# Patient Record
Sex: MALE | Race: WHITE | NOT HISPANIC OR LATINO | Employment: OTHER | URBAN - METROPOLITAN AREA
[De-identification: names, ages, dates, MRNs, and addresses within clinical notes are randomized per-mention and may not be internally consistent; named-entity substitution may affect disease eponyms.]

---

## 2017-09-17 ENCOUNTER — HOSPITAL ENCOUNTER (EMERGENCY)
Facility: HOSPITAL | Age: 67
Discharge: HOME | End: 2017-09-17
Attending: EMERGENCY MEDICINE
Payer: MEDICARE

## 2017-09-17 VITALS
SYSTOLIC BLOOD PRESSURE: 154 MMHG | HEART RATE: 96 BPM | DIASTOLIC BLOOD PRESSURE: 73 MMHG | WEIGHT: 143 LBS | OXYGEN SATURATION: 98 % | BODY MASS INDEX: 22.44 KG/M2 | TEMPERATURE: 98 F | RESPIRATION RATE: 20 BRPM | HEIGHT: 67 IN

## 2017-09-17 DIAGNOSIS — R11.10 EMESIS: Primary | ICD-10-CM

## 2017-09-17 LAB
ALBUMIN SERPL BCP-MCNC: 4.9 G/DL
ALP SERPL-CCNC: 81 U/L
ALT SERPL W/O P-5'-P-CCNC: 21 U/L
ANION GAP SERPL CALC-SCNC: 14 MMOL/L
AST SERPL-CCNC: 28 U/L
BACTERIA #/AREA URNS AUTO: ABNORMAL /HPF
BASOPHILS # BLD AUTO: 0.01 K/UL
BASOPHILS NFR BLD: 0.1 %
BILIRUB SERPL-MCNC: 1.8 MG/DL
BILIRUB UR QL STRIP: NEGATIVE
BUN SERPL-MCNC: 18 MG/DL
CALCIUM SERPL-MCNC: 10 MG/DL
CHLORIDE SERPL-SCNC: 101 MMOL/L
CLARITY UR REFRACT.AUTO: CLEAR
CO2 SERPL-SCNC: 25 MMOL/L
COLOR UR AUTO: YELLOW
CREAT SERPL-MCNC: 0.92 MG/DL
DIFFERENTIAL METHOD: ABNORMAL
EOSINOPHIL # BLD AUTO: 0 K/UL
EOSINOPHIL NFR BLD: 0.4 %
ERYTHROCYTE [DISTWIDTH] IN BLOOD BY AUTOMATED COUNT: 13.7 %
EST. GFR  (AFRICAN AMERICAN): >60 ML/MIN/1.73 M^2
EST. GFR  (NON AFRICAN AMERICAN): >60 ML/MIN/1.73 M^2
GLUCOSE SERPL-MCNC: 146 MG/DL
GLUCOSE UR QL STRIP: NEGATIVE
HCT VFR BLD AUTO: 46.5 %
HGB BLD-MCNC: 15.7 G/DL
HGB UR QL STRIP: ABNORMAL
KETONES UR QL STRIP: ABNORMAL
LEUKOCYTE ESTERASE UR QL STRIP: ABNORMAL
LYMPHOCYTES # BLD AUTO: 1.4 K/UL
LYMPHOCYTES NFR BLD: 17.9 %
MCH RBC QN AUTO: 31 PG
MCHC RBC AUTO-ENTMCNC: 33.8 G/DL
MCV RBC AUTO: 92 FL
MICROSCOPIC COMMENT: ABNORMAL
MONOCYTES # BLD AUTO: 0.6 K/UL
MONOCYTES NFR BLD: 7.7 %
NEUTROPHILS # BLD AUTO: 5.6 K/UL
NEUTROPHILS NFR BLD: 73.6 %
NITRITE UR QL STRIP: NEGATIVE
PH UR STRIP: 6 [PH] (ref 5–8)
PLATELET # BLD AUTO: 316 K/UL
PMV BLD AUTO: 8.6 FL
POTASSIUM SERPL-SCNC: 4 MMOL/L
PROT SERPL-MCNC: 8.4 G/DL
PROT UR QL STRIP: ABNORMAL
RBC # BLD AUTO: 5.06 M/UL
RBC #/AREA URNS AUTO: 5 /HPF (ref 0–4)
SODIUM SERPL-SCNC: 140 MMOL/L
SP GR UR STRIP: 1.02 (ref 1–1.03)
SQUAMOUS #/AREA URNS AUTO: ABNORMAL /HPF
URN SPEC COLLECT METH UR: ABNORMAL
UROBILINOGEN UR STRIP-ACNC: 1 EU/DL
WBC # BLD AUTO: 7.56 K/UL
WBC #/AREA URNS AUTO: 4 /HPF (ref 0–5)

## 2017-09-17 PROCEDURE — 80053 COMPREHEN METABOLIC PANEL: CPT

## 2017-09-17 PROCEDURE — 85025 COMPLETE CBC W/AUTO DIFF WBC: CPT

## 2017-09-17 PROCEDURE — 99284 EMERGENCY DEPT VISIT MOD MDM: CPT

## 2017-09-17 PROCEDURE — 81000 URINALYSIS NONAUTO W/SCOPE: CPT

## 2017-09-17 RX ORDER — CILOSTAZOL 100 MG/1
50 TABLET ORAL 2 TIMES DAILY
COMMUNITY

## 2017-09-17 RX ORDER — LORATADINE 10 MG/1
10 TABLET ORAL DAILY
Qty: 60 TABLET | Refills: 0 | Status: SHIPPED | OUTPATIENT
Start: 2017-09-17 | End: 2018-09-17

## 2017-09-17 RX ORDER — CLOPIDOGREL BISULFATE 75 MG/1
75 TABLET ORAL DAILY
COMMUNITY

## 2017-09-17 RX ORDER — CILOSTAZOL 50 MG/1
50 TABLET ORAL 2 TIMES DAILY
COMMUNITY

## 2017-09-17 RX ORDER — METFORMIN HYDROCHLORIDE 1000 MG/1
1000 TABLET ORAL 2 TIMES DAILY WITH MEALS
COMMUNITY

## 2017-09-17 RX ORDER — NITROGLYCERIN 0.3 MG/1
0.3 TABLET SUBLINGUAL EVERY 5 MIN PRN
COMMUNITY

## 2017-09-17 RX ORDER — LORATADINE 10 MG/1
10 TABLET ORAL DAILY
Qty: 60 TABLET | Refills: 0 | COMMUNITY
Start: 2017-09-17 | End: 2017-09-17

## 2017-09-17 RX ORDER — PIOGLITAZONEHYDROCHLORIDE 45 MG/1
45 TABLET ORAL DAILY
COMMUNITY

## 2017-09-17 RX ORDER — ASPIRIN 81 MG/1
81 TABLET ORAL DAILY
COMMUNITY

## 2017-09-17 RX ORDER — LOSARTAN POTASSIUM AND HYDROCHLOROTHIAZIDE 12.5; 1 MG/1; MG/1
1 TABLET ORAL DAILY
COMMUNITY
End: 2021-10-04 | Stop reason: SDUPTHER

## 2017-09-17 RX ORDER — PROMETHAZINE HYDROCHLORIDE 25 MG/1
25 TABLET ORAL EVERY 6 HOURS PRN
Qty: 15 TABLET | Refills: 0 | Status: SHIPPED | OUTPATIENT
Start: 2017-09-17

## 2017-09-18 NOTE — ED PROVIDER NOTES
"Encounter Date: 9/17/2017       History     Chief Complaint   Patient presents with    Nausea     Pt c/o N/V since yesterday.  PT denies any abd pain or diarrhea.  PT c/o belching a lot.  PT's last BM was approx 2-3 days ago.  Pt states, "That's normal."  'I don't eat that much."      Vomiting     The history is provided by the patient.   Emesis    This is a new problem. The current episode started yesterday. The problem occurs 2 - 4 times per day. The problem has been unchanged. The emesis has an appearance of stomach contents. Pertinent negatives include no abdominal pain, no arthralgias, no chills, no cough, no diarrhea, no fever, no headaches, no myalgias and no sweats.     Review of patient's allergies indicates:  No Known Allergies  Past Medical History:   Diagnosis Date    Anticoagulant long-term use     Coronary artery disease     Diabetes mellitus     High cholesterol     Hypertension      Past Surgical History:   Procedure Laterality Date    CORONARY ANGIOPLASTY WITH STENT PLACEMENT      EXTERNAL EAR SURGERY       History reviewed. No pertinent family history.  Social History   Substance Use Topics    Smoking status: Former Smoker    Smokeless tobacco: Never Used    Alcohol use No     Review of Systems   Constitutional: Negative for chills and fever.   Respiratory: Negative for cough.    Gastrointestinal: Positive for vomiting. Negative for abdominal pain and diarrhea.   Musculoskeletal: Negative for arthralgias and myalgias.   Neurological: Negative for headaches.   All other systems reviewed and are negative.      Physical Exam     Initial Vitals [09/17/17 1932]   BP Pulse Resp Temp SpO2   (!) 121/58 101 20 98.1 °F (36.7 °C) 95 %      MAP       79         Physical Exam    Nursing note and vitals reviewed.  Constitutional: He appears well-developed and well-nourished.   HENT:   Head: Normocephalic and atraumatic.   Eyes: EOM are normal.   Neck: Normal range of motion. Neck supple. "   Cardiovascular: Normal rate, regular rhythm, normal heart sounds and intact distal pulses.   Pulmonary/Chest: Breath sounds normal.   Abdominal: Soft.   Musculoskeletal: Normal range of motion.   Neurological: He is alert and oriented to person, place, and time.   Skin: Skin is warm and dry. Capillary refill takes less than 2 seconds.   Psychiatric: He has a normal mood and affect. His behavior is normal. Judgment and thought content normal.         ED Course   Procedures  Labs Reviewed   COMPREHENSIVE METABOLIC PANEL - Abnormal; Notable for the following:        Result Value    Glucose 146 (*)     Total Bilirubin 1.8 (*)     All other components within normal limits   CBC W/ AUTO DIFFERENTIAL - Abnormal; Notable for the following:     MPV 8.6 (*)     Gran% 73.6 (*)     Lymph% 17.9 (*)     All other components within normal limits   URINALYSIS - Abnormal; Notable for the following:     Protein, UA Trace (*)     Ketones, UA 1+ (*)     Occult Blood UA Trace (*)     Leukocytes, UA 1+ (*)     All other components within normal limits   URINALYSIS MICROSCOPIC - Abnormal; Notable for the following:     RBC, UA 5 (*)     All other components within normal limits             Medical Decision Making:   Clinical Tests:   Lab Tests: Ordered and Reviewed  Radiological Study: Ordered and Reviewed                   ED Course      Clinical Impression:   The encounter diagnosis was Emesis.    Disposition:   Disposition: Discharged  Condition: Stable                        Christine Pantoja MD  09/17/17 5486

## 2017-12-17 ENCOUNTER — HOSPITAL ENCOUNTER (EMERGENCY)
Facility: HOSPITAL | Age: 67
Discharge: HOME | End: 2017-12-17
Attending: EMERGENCY MEDICINE
Payer: MEDICARE

## 2017-12-17 VITALS
TEMPERATURE: 98 F | WEIGHT: 137 LBS | DIASTOLIC BLOOD PRESSURE: 70 MMHG | BODY MASS INDEX: 21.5 KG/M2 | OXYGEN SATURATION: 99 % | HEART RATE: 90 BPM | SYSTOLIC BLOOD PRESSURE: 138 MMHG | RESPIRATION RATE: 18 BRPM | HEIGHT: 67 IN

## 2017-12-17 DIAGNOSIS — S90.30XA: Primary | ICD-10-CM

## 2017-12-17 PROCEDURE — 99283 EMERGENCY DEPT VISIT LOW MDM: CPT

## 2017-12-17 RX ORDER — GABAPENTIN 300 MG/1
300 CAPSULE ORAL 3 TIMES DAILY
COMMUNITY

## 2017-12-17 RX ORDER — OMEPRAZOLE 40 MG/1
40 CAPSULE, DELAYED RELEASE ORAL DAILY
COMMUNITY

## 2017-12-17 RX ORDER — LANOLIN ALCOHOL/MO/W.PET/CERES
100 CREAM (GRAM) TOPICAL DAILY
COMMUNITY

## 2017-12-18 NOTE — ED NOTES
Ambulatory to ER room 6 with steady gait; pt reports bruising to top of L foot, no open wounds noted, +2 pulses noted.  Pt denies injury.  Reports being on blood thinners and hx of neuropathy reported.  Awaiting MD assessment.  Will monitor closely.

## 2017-12-18 NOTE — ED PROVIDER NOTES
"Encounter Date: 12/17/2017       History     Chief Complaint   Patient presents with    Foot Injury     Left foot ecchymosis/swelling since this am, patient denies any hx of injury.  Patient states he has "blockages" in both legs, skin warm/dry, (+) distal pulses.     The history is provided by the patient.   Foot Injury    The incident occurred at home. There was no injury mechanism. The incident occurred several hours ago. Pain location: none. The pain is at a severity of 0/10. Pertinent negatives include no numbness, no inability to bear weight, no loss of motion, no muscle weakness, no loss of sensation and no tingling. He reports no foreign bodies present. Nothing aggravates the symptoms. He has tried nothing for the symptoms.     Review of patient's allergies indicates:  No Known Allergies  Past Medical History:   Diagnosis Date    Anticoagulant long-term use     Coronary artery disease     Diabetes mellitus     High cholesterol     Hypertension      Past Surgical History:   Procedure Laterality Date    CORONARY ANGIOPLASTY WITH STENT PLACEMENT      EXTERNAL EAR SURGERY      Stents in legs       History reviewed. No pertinent family history.  Social History   Substance Use Topics    Smoking status: Former Smoker    Smokeless tobacco: Never Used    Alcohol use No     Review of Systems   Skin: Positive for color change.   Neurological: Negative for tingling and numbness.       Physical Exam     Initial Vitals [12/17/17 2032]   BP Pulse Resp Temp SpO2   (!) 145/64 101 20 97.7 °F (36.5 °C) 100 %      MAP       91         Physical Exam    Nursing note and vitals reviewed.  Constitutional: He appears well-developed and well-nourished.   HENT:   Head: Normocephalic and atraumatic.   Eyes: EOM are normal.   Neck: Normal range of motion. Neck supple.   Cardiovascular: Normal rate, regular rhythm, normal heart sounds and intact distal pulses.   Pulmonary/Chest: Breath sounds normal.   Abdominal: Soft. "   Musculoskeletal: Normal range of motion.        Feet:    Neurological: He is alert and oriented to person, place, and time.   Skin: Skin is warm and dry. Capillary refill takes less than 2 seconds.   Psychiatric: He has a normal mood and affect. His behavior is normal. Judgment and thought content normal.         ED Course   Procedures  Labs Reviewed - No data to display                            ED Course      Clinical Impression:   The encounter diagnosis was Traumatic ecchymosis of foot, initial encounter.    Disposition:   Disposition: Discharged  Condition: Stable                        Christine Pantoja MD  12/17/17 2079

## 2021-10-04 ENCOUNTER — HOSPITAL ENCOUNTER (EMERGENCY)
Facility: HOSPITAL | Age: 71
Discharge: LEFT BEFORE DISCHARGE | End: 2021-10-04
Attending: EMERGENCY MEDICINE
Payer: MEDICARE

## 2021-10-04 VITALS
TEMPERATURE: 98 F | OXYGEN SATURATION: 98 % | SYSTOLIC BLOOD PRESSURE: 209 MMHG | HEART RATE: 86 BPM | DIASTOLIC BLOOD PRESSURE: 92 MMHG | BODY MASS INDEX: 19.73 KG/M2 | RESPIRATION RATE: 31 BRPM | WEIGHT: 126 LBS

## 2021-10-04 DIAGNOSIS — J40 BRONCHITIS: ICD-10-CM

## 2021-10-04 DIAGNOSIS — R04.2 HEMOPTYSIS: ICD-10-CM

## 2021-10-04 DIAGNOSIS — R05.9 COUGH: ICD-10-CM

## 2021-10-04 DIAGNOSIS — I10 HYPERTENSION, UNSPECIFIED TYPE: Primary | ICD-10-CM

## 2021-10-04 LAB
ALBUMIN SERPL BCP-MCNC: 4.7 G/DL (ref 3.5–5.2)
ALP SERPL-CCNC: 85 U/L (ref 38–126)
ALT SERPL W/O P-5'-P-CCNC: 10 U/L (ref 10–44)
ANION GAP SERPL CALC-SCNC: 14 MMOL/L (ref 8–16)
APTT BLDCRRT: 26.5 SEC (ref 21–32)
AST SERPL-CCNC: 24 U/L (ref 15–46)
BASOPHILS # BLD AUTO: 0.02 K/UL (ref 0–0.2)
BASOPHILS NFR BLD: 0.2 % (ref 0–1.9)
BILIRUB SERPL-MCNC: 1.1 MG/DL (ref 0.1–1)
CALCIUM SERPL-MCNC: 9.7 MG/DL (ref 8.7–10.5)
CHLORIDE SERPL-SCNC: 104 MMOL/L (ref 95–110)
CO2 SERPL-SCNC: 25 MMOL/L (ref 23–29)
CREAT SERPL-MCNC: 0.61 MG/DL (ref 0.5–1.4)
DIFFERENTIAL METHOD: ABNORMAL
EOSINOPHIL # BLD AUTO: 0 K/UL (ref 0–0.5)
EOSINOPHIL NFR BLD: 0.5 % (ref 0–8)
ERYTHROCYTE [DISTWIDTH] IN BLOOD BY AUTOMATED COUNT: 12.3 % (ref 11.5–14.5)
EST. GFR  (AFRICAN AMERICAN): >60 ML/MIN/1.73 M^2
EST. GFR  (NON AFRICAN AMERICAN): >60 ML/MIN/1.73 M^2
GLUCOSE SERPL-MCNC: 187 MG/DL (ref 70–110)
HCT VFR BLD AUTO: 45 % (ref 40–54)
HGB BLD-MCNC: 15.2 G/DL (ref 14–18)
IMM GRANULOCYTES # BLD AUTO: 0.05 K/UL (ref 0–0.04)
IMM GRANULOCYTES NFR BLD AUTO: 0.6 % (ref 0–0.5)
INR PPP: 1 (ref 0.8–1.2)
LYMPHOCYTES # BLD AUTO: 1.1 K/UL (ref 1–4.8)
LYMPHOCYTES NFR BLD: 12.8 % (ref 18–48)
MCH RBC QN AUTO: 31.3 PG (ref 27–31)
MCHC RBC AUTO-ENTMCNC: 33.8 G/DL (ref 32–36)
MCV RBC AUTO: 93 FL (ref 82–98)
MONOCYTES # BLD AUTO: 0.5 K/UL (ref 0.3–1)
MONOCYTES NFR BLD: 5.7 % (ref 4–15)
NEUTROPHILS # BLD AUTO: 6.9 K/UL (ref 1.8–7.7)
NEUTROPHILS NFR BLD: 80.2 % (ref 38–73)
NRBC BLD-RTO: 0 /100 WBC
NT-PROBNP SERPL-MCNC: 420 PG/ML (ref 5–900)
PLATELET # BLD AUTO: 342 K/UL (ref 150–450)
PMV BLD AUTO: 8.6 FL (ref 9.2–12.9)
POTASSIUM SERPL-SCNC: 3.6 MMOL/L (ref 3.5–5.1)
PROT SERPL-MCNC: 7.9 G/DL (ref 6–8.4)
PROTHROMBIN TIME: 10 SEC (ref 9–12.5)
RBC # BLD AUTO: 4.85 M/UL (ref 4.6–6.2)
SODIUM SERPL-SCNC: 143 MMOL/L (ref 136–145)
TROPONIN I SERPL-MCNC: 0.01 NG/ML (ref 0.01–0.03)
UUN UR-MCNC: 13 MG/DL (ref 2–20)
WBC # BLD AUTO: 8.59 K/UL (ref 3.9–12.7)

## 2021-10-04 PROCEDURE — 99285 EMERGENCY DEPT VISIT HI MDM: CPT | Mod: 25,ER

## 2021-10-04 PROCEDURE — 85730 THROMBOPLASTIN TIME PARTIAL: CPT | Mod: ER | Performed by: EMERGENCY MEDICINE

## 2021-10-04 PROCEDURE — 63600175 PHARM REV CODE 636 W HCPCS: Mod: ER | Performed by: EMERGENCY MEDICINE

## 2021-10-04 PROCEDURE — 80053 COMPREHEN METABOLIC PANEL: CPT | Mod: ER | Performed by: EMERGENCY MEDICINE

## 2021-10-04 PROCEDURE — 25000003 PHARM REV CODE 250: Mod: ER | Performed by: EMERGENCY MEDICINE

## 2021-10-04 PROCEDURE — 85025 COMPLETE CBC W/AUTO DIFF WBC: CPT | Mod: ER | Performed by: EMERGENCY MEDICINE

## 2021-10-04 PROCEDURE — 84484 ASSAY OF TROPONIN QUANT: CPT | Mod: ER | Performed by: EMERGENCY MEDICINE

## 2021-10-04 PROCEDURE — 83880 ASSAY OF NATRIURETIC PEPTIDE: CPT | Mod: ER | Performed by: EMERGENCY MEDICINE

## 2021-10-04 PROCEDURE — 96374 THER/PROPH/DIAG INJ IV PUSH: CPT | Mod: ER

## 2021-10-04 PROCEDURE — 93005 ELECTROCARDIOGRAM TRACING: CPT | Mod: ER

## 2021-10-04 PROCEDURE — 93010 ELECTROCARDIOGRAM REPORT: CPT | Mod: ,,, | Performed by: INTERNAL MEDICINE

## 2021-10-04 PROCEDURE — 85610 PROTHROMBIN TIME: CPT | Mod: ER | Performed by: EMERGENCY MEDICINE

## 2021-10-04 PROCEDURE — 96361 HYDRATE IV INFUSION ADD-ON: CPT | Mod: ER

## 2021-10-04 PROCEDURE — 93010 EKG 12-LEAD: ICD-10-PCS | Mod: ,,, | Performed by: INTERNAL MEDICINE

## 2021-10-04 PROCEDURE — 25500020 PHARM REV CODE 255: Mod: ER | Performed by: EMERGENCY MEDICINE

## 2021-10-04 PROCEDURE — C9113 INJ PANTOPRAZOLE SODIUM, VIA: HCPCS | Mod: ER | Performed by: EMERGENCY MEDICINE

## 2021-10-04 RX ORDER — HYDROCHLOROTHIAZIDE 12.5 MG/1
12.5 TABLET ORAL ONCE
Status: DISCONTINUED | OUTPATIENT
Start: 2021-10-04 | End: 2021-10-04

## 2021-10-04 RX ORDER — HYDROCHLOROTHIAZIDE 25 MG/1
25 TABLET ORAL
Status: COMPLETED | OUTPATIENT
Start: 2021-10-04 | End: 2021-10-04

## 2021-10-04 RX ORDER — BENZONATATE 100 MG/1
100 CAPSULE ORAL
Status: COMPLETED | OUTPATIENT
Start: 2021-10-04 | End: 2021-10-04

## 2021-10-04 RX ORDER — METOPROLOL SUCCINATE 100 MG/1
100 TABLET, EXTENDED RELEASE ORAL DAILY
Qty: 30 TABLET | Refills: 0 | Status: SHIPPED | OUTPATIENT
Start: 2021-10-04 | End: 2021-11-03

## 2021-10-04 RX ORDER — BENZONATATE 100 MG/1
100 CAPSULE ORAL 3 TIMES DAILY PRN
Qty: 9 CAPSULE | Refills: 0 | Status: SHIPPED | OUTPATIENT
Start: 2021-10-04 | End: 2021-10-07

## 2021-10-04 RX ORDER — LOSARTAN POTASSIUM 25 MG/1
100 TABLET ORAL
Status: COMPLETED | OUTPATIENT
Start: 2021-10-04 | End: 2021-10-04

## 2021-10-04 RX ORDER — LOSARTAN POTASSIUM AND HYDROCHLOROTHIAZIDE 12.5; 1 MG/1; MG/1
1 TABLET ORAL DAILY
Qty: 30 TABLET | Refills: 1 | Status: SHIPPED | OUTPATIENT
Start: 2021-10-04 | End: 2021-12-03

## 2021-10-04 RX ORDER — PANTOPRAZOLE SODIUM 40 MG/10ML
40 INJECTION, POWDER, LYOPHILIZED, FOR SOLUTION INTRAVENOUS
Status: COMPLETED | OUTPATIENT
Start: 2021-10-04 | End: 2021-10-04

## 2021-10-04 RX ORDER — CLONIDINE HYDROCHLORIDE 0.1 MG/1
0.2 TABLET ORAL
Status: COMPLETED | OUTPATIENT
Start: 2021-10-04 | End: 2021-10-04

## 2021-10-04 RX ORDER — METOPROLOL SUCCINATE 100 MG/1
100 TABLET, EXTENDED RELEASE ORAL DAILY
COMMUNITY
End: 2021-10-04 | Stop reason: SDUPTHER

## 2021-10-04 RX ORDER — CLONIDINE HYDROCHLORIDE 0.1 MG/1
0.2 TABLET ORAL
Status: DISCONTINUED | OUTPATIENT
Start: 2021-10-04 | End: 2021-10-04

## 2021-10-04 RX ORDER — AZITHROMYCIN 250 MG/1
250 TABLET, FILM COATED ORAL DAILY
Qty: 6 TABLET | Refills: 0 | Status: SHIPPED | OUTPATIENT
Start: 2021-10-04

## 2021-10-04 RX ADMIN — CLONIDINE HYDROCHLORIDE 0.2 MG: 0.1 TABLET ORAL at 09:10

## 2021-10-04 RX ADMIN — BENZONATATE 100 MG: 100 CAPSULE ORAL at 07:10

## 2021-10-04 RX ADMIN — PANTOPRAZOLE SODIUM 40 MG: 40 INJECTION, POWDER, FOR SOLUTION INTRAVENOUS at 07:10

## 2021-10-04 RX ADMIN — IOHEXOL 75 ML: 350 INJECTION, SOLUTION INTRAVENOUS at 09:10

## 2021-10-04 RX ADMIN — LOSARTAN POTASSIUM 100 MG: 25 TABLET, FILM COATED ORAL at 07:10

## 2021-10-04 RX ADMIN — HYDROCHLOROTHIAZIDE 12.5 MG: 25 TABLET ORAL at 08:10

## 2021-10-04 RX ADMIN — SODIUM CHLORIDE 500 ML: 0.9 INJECTION, SOLUTION INTRAVENOUS at 09:10

## 2024-04-25 ENCOUNTER — HOSPITAL ENCOUNTER (EMERGENCY)
Facility: HOSPITAL | Age: 74
Discharge: HOME | End: 2024-04-25
Attending: EMERGENCY MEDICINE
Payer: MEDICARE

## 2024-04-25 VITALS
DIASTOLIC BLOOD PRESSURE: 77 MMHG | BODY MASS INDEX: 20.09 KG/M2 | SYSTOLIC BLOOD PRESSURE: 175 MMHG | OXYGEN SATURATION: 99 % | HEART RATE: 82 BPM | WEIGHT: 125 LBS | HEIGHT: 66 IN | RESPIRATION RATE: 16 BRPM | TEMPERATURE: 98 F
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
## 2024-04-25 DIAGNOSIS — S61.210A LACERATION OF RIGHT INDEX FINGER WITHOUT FOREIGN BODY WITHOUT DAMAGE TO NAIL, INITIAL ENCOUNTER: ICD-10-CM

## 2024-04-25 PROCEDURE — 25000003 PHARM REV CODE 250: Performed by: PHYSICIAN ASSISTANT

## 2024-04-25 PROCEDURE — 99284 EMERGENCY DEPT VISIT MOD MDM: CPT | Mod: 25

## 2024-04-25 PROCEDURE — 12042 INTMD RPR N-HF/GENIT2.6-7.5: CPT

## 2024-04-25 PROCEDURE — 63600175 PHARM REV CODE 636 W HCPCS: Performed by: PHYSICIAN ASSISTANT

## 2024-04-25 RX ORDER — LIDOCAINE HYDROCHLORIDE 20 MG/ML
10 INJECTION, SOLUTION INFILTRATION; PERINEURAL
Status: DISCONTINUED | OUTPATIENT
Start: 2024-04-25 | End: 2024-04-25 | Stop reason: HOSPADM

## 2024-04-25 RX ORDER — AMOXICILLIN AND CLAVULANATE POTASSIUM 875; 125 MG/1; MG/1
1 TABLET, FILM COATED ORAL 2 TIMES DAILY
Qty: 20 TABLET | Refills: 0 | Status: SHIPPED | OUTPATIENT
Start: 2024-04-25 | End: 2024-05-05

## 2024-04-25 RX ORDER — ACETAMINOPHEN 500 MG
1000 TABLET ORAL
Status: COMPLETED | OUTPATIENT
Start: 2024-04-25 | End: 2024-04-25

## 2024-04-25 RX ADMIN — ACETAMINOPHEN 1000 MG: 500 TABLET ORAL at 04:04

## 2024-04-25 RX ADMIN — AMPICILLIN AND SULBACTAM 3 G: 2; 1 INJECTION, POWDER, FOR SOLUTION INTRAVENOUS at 04:04

## 2024-04-25 NOTE — ED TRIAGE NOTES
Patient comes into the emergency department by POV with complaints of animal bite. Patient states that taking out trash whe stray dog bit right index finger, bleeding controlled in triage, hx of blood thinners which he took this AM. Patient states he is in no pain right now.     LOC: The patient is awake, alert and aware of environment with an appropriate affect, the patient is oriented x 3 and speaking appropriately.   APPEARANCE: Patient appears comfortable and in no acute distress, patient is clean and well groomed.  SKIN: The skin is warm and dry, color consistent with ethnicity, patient has normal skin turgor and moist mucus membranes, skin intact, no breakdown or bruising noted. Right index finger bruised with laceration.  MUSCULOSKELETAL: Patient moving all extremities spontaneously, no swelling noted.  RESPIRATORY: Airway is open and patent, respirations are spontaneous, patient has a normal effort and rate, no accessory muscle.  CARDIAC:  no edema noted, capillary refill < 3 seconds.   GASTRO: Soft and non tender to palpation, no distention noted.  : Pt denies any pain or frequency with urination.  NEURO: Pt opens eyes spontaneously, behavior appropriate to situation, follows commands, facial expression symmetrical, bilateral hand grasp equal and even, purposeful motor response noted, normal sensation in all extremities when touched with a finger.

## 2024-04-25 NOTE — ED PROVIDER NOTES
Encounter Date: 4/25/2024       History     Chief Complaint   Patient presents with    Animal Bite     Patient reports he was bitten by a stray dog while taking the trash outside. Patient with bite to right index finger--bleeding controlled in triage. Last tetanus shot x 2 years ago per patient report.     The history is provided by the patient and medical records. No  was used.     Kishan Dow is a 73 y.o. male with medical history of HTN, DM, CAD on Plavix presenting to the ED with the chief complaint of animal bite.     Patient was breaking up a fight between his daughter's dog and 2 of the neighbors dog about 2.5 hours PTA. Sustained a bite injury to his R index finger. He is unclear which dog bit his finger. Reports all dogs are up to date on their vaccinations. Reports having several lacerations to his R index finger. He is compliant with his Plavix therapy. Denies other bodily injury. No numbness or ROM difficulties.    Review of patient's allergies indicates:  No Known Allergies  Past Medical History:   Diagnosis Date    Anticoagulant long-term use     Coronary artery disease     Diabetes mellitus     High cholesterol     Hypertension      Past Surgical History:   Procedure Laterality Date    CORONARY ANGIOPLASTY WITH STENT PLACEMENT      EXTERNAL EAR SURGERY      Stents in legs       No family history on file.  Social History     Tobacco Use    Smoking status: Former    Smokeless tobacco: Never   Substance Use Topics    Alcohol use: No    Drug use: No     Review of Systems   Skin:  Positive for wound.       Physical Exam     Initial Vitals [04/25/24 1413]   BP Pulse Resp Temp SpO2   139/70 110 16 97.5 °F (36.4 °C) 95 %      MAP       --         Physical Exam    Constitutional: He appears well-developed and well-nourished. He is not diaphoretic. No distress.   HENT:   Head: Normocephalic and atraumatic.   Mouth/Throat: Oropharynx is clear and moist.   Eyes: EOM are normal. Pupils are  equal, round, and reactive to light.   Neck:   Normal range of motion.  Cardiovascular:  Normal rate and regular rhythm.           Pulmonary/Chest: No respiratory distress.   Musculoskeletal:         General: Normal range of motion.      Cervical back: Normal range of motion.      Comments: R index finger  Full MCP, PIP, DIP ROM. No nailbed injury  Two lacerations involving middle and proximal phalanx palmar aspect  Superficial laceration to dorsal aspect of middle phalanx     Neurological: He is alert and oriented to person, place, and time.   Skin: Skin is warm and dry. No rash noted.     R index finger:            ED Course   Lac Repair    Date/Time: 4/25/2024 7:45 PM    Performed by: Philip Hampton PA-C  Authorized by: Inez Lomax Jr., MD    Consent:     Consent obtained:  Verbal and emergent situation    Risks discussed:  Infection and need for additional repair  Universal protocol:     Patient identity confirmed:  Verbally with patient  Laceration details:     Location:  Finger    Finger location:  R index finger    Length (cm):  3  Exploration:     Imaging obtained: x-ray      Imaging outcome: foreign body not noted      Wound exploration: wound explored through full range of motion and entire depth of wound visualized    Treatment:     Area cleansed with:  Saline and povidone-iodine    Amount of cleaning:  Extensive    Irrigation solution:  Sterile saline    Irrigation method:  Pressure wash    Visualized foreign bodies/material removed: no      Debridement:  Minimal    Undermining:  None    Scar revision: no    Skin repair:     Repair method:  Sutures and Steri-Strips    Suture size:  4-0    Suture material:  Nylon    Suture technique:  Simple interrupted    Number of sutures:  2    Number of Steri-Strips:  2  Approximation:     Approximation:  Loose  Repair type:     Repair type:  Intermediate  Post-procedure details:     Dressing:  Non-adherent dressing    Procedure completion:  Tolerated well,  no immediate complications    Labs Reviewed - No data to display       Imaging Results              X-Ray Finger 2 or More Views Right (Final result)  Result time 04/25/24 17:28:54      Final result by Goyo Fuentes MD (04/25/24 17:28:54)                   Impression:      1. No convincing acute displaced fracture or dislocation of the 2nd digit noting edema and soft tissue injury.      Electronically signed by: Goyo Fuentes MD  Date:    04/25/2024  Time:    17:28               Narrative:    EXAMINATION:  XR FINGER 2 OR MORE VIEWS RIGHT    CLINICAL HISTORY:  R index finger injury. Dog bite;    COMPARISON:  None    FINDINGS:  Four views right hand.    There is osteopenia.  There are degenerative changes of the PIP and D IP joints of the visualized digits.  There is edema about the mid aspect of the 2nd digit.  No convincing acute displaced fracture or dislocation of the visualized digits.  No radiopaque foreign body.                                       Medications   LIDOcaine HCL 20 mg/ml (2%) injection 10 mL (0 mLs Infiltration Hold 4/25/24 1600)   ampicillin-sulbactam (UNASYN) 3 g in sodium chloride 0.9 % 100 mL IVPB (MB+) (0 g Intravenous Stopped 4/25/24 1714)   acetaminophen tablet 1,000 mg (1,000 mg Oral Given 4/25/24 1656)     Medical Decision Making  73 y.o. male with medical history of HTN, DM, CAD on Plavix presenting to the ED c/o R index finger bite wound occurring 2.5 hours PTA.     DDx includes but not limited to laceration, foreign body, arterial injury, nerve injury, fracture or tendon injury. Reports dogs were up to date on vaccinations and do not feel Rabies prophylaxis needed at this time. Offered to update patient's tetanus but he declined and acknowledged risks of doing so.       Amount and/or Complexity of Data Reviewed  Radiology: ordered.    Risk  OTC drugs.  Prescription drug management.               ED Course as of 04/25/24 1944   Thu Apr 25, 2024   1902 -XR with no evidence of FB  or fracture.   -Extensively irrigated wound and cleansed at bedside.  -Wound loosely closed with sutures and steri-strips as described in procedure note above.  -Finger splint and ACE wrap applied.   -I discussed my concerns for high risk of infection with the patient and his daughter at bedside. I discussed importance of watching the wound and to return to the ED for signs of infection.  -Wound care instructions provided.   -Dose of Unasyn given in the ED. RX for Augmentin provided.  -Advised outpatient follow-up with Ortho hand for further evaluation. Ambulatory referral placed.  Patient expresses understanding and agreeable to the plan. Return to ED precautions given for new, worsening, or concerning symptoms. I have discussed the care of this patient with my supervising physician.  [BA]      ED Course User Index  [BA] Philip Hampton PA-C                           Clinical Impression:  Final diagnoses:  [W54.0XXA] Dog bite, initial encounter (Primary)  [S61.210A] Laceration of right index finger without foreign body without damage to nail, initial encounter          ED Disposition Condition    Discharge Stable          ED Prescriptions       Medication Sig Dispense Start Date End Date Auth. Provider    amoxicillin-clavulanate 875-125mg (AUGMENTIN) 875-125 mg per tablet Take 1 tablet by mouth 2 (two) times daily. for 10 days 20 tablet 4/25/2024 5/5/2024 Philip Hampton PA-C          Follow-up Information       Follow up With Specialties Details Why Contact Info Additional Information    [facility]   [address], [address]  [facility] [address] [phone] [facility] - Main Building, 5th Floor Please park in [facility] and take [address] elevator             Philip Hampton PA-C  04/25/24 9989

## 2024-04-26 ENCOUNTER — TELEPHONE (OUTPATIENT)
Dept: ORTHOPEDICS | Facility: CLINIC | Age: 74
End: 2024-04-26
Payer: MEDICARE

## 2024-04-26 NOTE — DISCHARGE INSTRUCTIONS
Diagnosis: Laceration    --Take the prescribed Augmentin to prevent an infection.   --Follow-up with [facility] for further management of your wound.   --Keep the original dressing in place for the next 24 hours and then change twice daily thereafter. Apply over-the-counter antibiotic ointment (Neosporin, Neomycin, Bacitracin, etc) when changing the dressing.  --You may cleanse daily by rinsing gently with soap and water. Change your dressing if it becomes soiled or wet.  --It is okay to shower. Do not soak the wound in water. This includes swimming pools, hot tubs, or while washing dishes.   --If your stitches or staples require removal, you may seek care at your primary care, urgent care, or the emergency room. [facility] will not charge you for an additional visit for stiches or staple removal regardless of your insurance status.   --Apply sunscreen to the area after sutures removed for the next year as sunscreen may aid in reducing the appearance of scaring  --Return to the emergency department if you develop fevers, spreading redness or streaking redness, severe pain, swelling, or leakage of pus from the wound.

## 2024-04-26 NOTE — TELEPHONE ENCOUNTER
Spoke c pt. Confirmed appt location & time [facility] 05/01/24 for wound check. Pts daughter expressed understanding & was thankful.

## 2024-04-29 NOTE — H&P (VIEW-ONLY)
[facility]  History & Physical  Orthopedics    SUBJECTIVE:     Chief Complaint: ED follow up right index finger laceration/ animal bite     Referring Provider: Self, Aaareferral     History of Present Illness:  Patient is a 73 y.o. right hand dominant male who presents ED FU for right index finger laceration/animal bite. Patient reports he was breaking a fight between his daughter's dog and neighbors dog and sustained a laceration to his right index finger. He reports going to the ER 4/25/2024. Partial wound closure with nylon stiches/steris trip and irrigation was performed by ER attending. Unasyn given in ER. Patient admits to taking Augmentin which was prescribed 4/25/2024. Patient was placed in finger splint.   He denies any pain. Reports stiffness when flexing right index finger. He denies any numbness or tingling.   Patient reports he is up to date with tetanus shot which he received two years ago.  He is on Plavix. He is a DM and smoker. Hx of MI in 2004.  Onset of symptoms/DOI was 4/25/2024.  The patient denies any fevers, chills, N/V, D/C and presents for evaluation.          Review of patient's allergies indicates:  No Known Allergies    Past Medical History:   Diagnosis Date    Anticoagulant long-term use     Coronary artery disease     Diabetes mellitus     High cholesterol     Hypertension      Past Surgical History:   Procedure Laterality Date    CORONARY ANGIOPLASTY WITH STENT PLACEMENT      EXTERNAL EAR SURGERY      Stents in legs       No family history on file.  Social History     Tobacco Use    Smoking status: Former    Smokeless tobacco: Never   Substance Use Topics    Alcohol use: No    Drug use: No        Review of Systems:  Constitutional: Denies fever/chills  Neurological: Denies numbness/tingling (any exceptions noted in orthopaedic exam)   Psychiatric/Behavioral: Denies change in normal mood  Eyes: Denies change in vision  Cardiovascular: Denies chest pain  Respiratory:  Denies shortness of breath  Hematologic/Lymphatic: Denies easy bleeding/bruising   Skin: Denies new rash or skin lesions   Gastrointestinal: Denies nausea/vomitting/diarrhea, change in bowel habits, abdominal pain   Allergic/Immunologic: Denies adverse reactions to current medications  Musculoskeletal: see HPI      OBJECTIVE:     Vital Signs (Most Recent)       Physical Exam:  Gen:  No acute distress  CV:  Peripherally well-perfused.  Pulses 2+ bilaterally.  Lungs:  Normal respiratory effort.  Abdomen:  Soft, non-tender, non-distended  Head/Neck:  Normocephalic.  Atraumatic. No TTP, AROM and PROM intact without pain  Neuro:  CN intact without deficit, SILT throughout B/L Upper & Lower Extremities    Right Hand/Wrist Examination:    Observation/Inspection:  Swelling  Mild right index finger    Deformity  none  Discoloration  none     Scars   none    Atrophy  none  Stiffness right index finger MCP, PIP, DIP ROM with with noted flexion and extension . No nailbed injury. No tenderness at  proximal palmar or dorsal aspect of right hand. No streaking. Patient able to flex and extend right wrist.   Two lacerations involving middle and proximal phalanx palmar aspect. With partial wound closure with nylon and steri strips intact.  Superficial laceration to dorsal aspect of middle phalanx .  Not able to make a composite fist.  Nylon Suture intact with lacerations partially open with steri strips. No drainage/erythema noted or     Neurovascular Exam:  Digits WWP, brisk CR < 3s throughout  NVI motor/LTS to M/R/U nerves, radial pulse 2+    ROM wrist full, painless    ROM elbow full, painless    Abdomen not guarded  Respirations nonlabored  Perfusion intact    Diagnostic Results:   Xray right index finger 04/25/2024  Impression:     1. No convincing acute displaced fracture or dislocation of the 2nd digit noting edema and soft tissue injury.  Imaging - I independently viewed the patient's imaging as well as the radiology report.    EMG - none     ASSESSMENT/PLAN:     There are no diagnoses linked to this encounter.     73 y.o. yo male with right index finger laceration   Kishan was seen today for finger injury, pain and itching.    Diagnoses and all orders for this visit:    Laceration of right index finger without foreign body without damage to nail, initial encounter         Plan:  XRAY right hand reviewed and discussed with patient. No acute fractures or dislocation noted. Patient was able to flex/extend DIP, PIP, MCP right index fingers including other digits. Patient was able to flex and extend wrist.  Based on HPI/ exam today discussed /recommended surgical intervention for wound closure and wound exploration. Risk and benefits were discussed.  Also recommended patient should refrain from smoking due to risk of delayed wound healing. Patient has a history of DM.   Patient agreed to this plan and surgical consent were signed today. Surgery to be scheduled Friday.   Patient redressed with radial gutter splint.

## 2024-05-01 ENCOUNTER — OFFICE VISIT (OUTPATIENT)
Dept: ORTHOPEDICS | Facility: CLINIC | Age: 74
End: 2024-05-01
Payer: MEDICARE

## 2024-05-01 ENCOUNTER — TELEPHONE (OUTPATIENT)
Dept: ORTHOPEDICS | Facility: CLINIC | Age: 74
End: 2024-05-01
Payer: MEDICARE

## 2024-05-01 VITALS
BODY MASS INDEX: 20.09 KG/M2 | WEIGHT: 125 LBS | SYSTOLIC BLOOD PRESSURE: 174 MMHG | HEIGHT: 66 IN | DIASTOLIC BLOOD PRESSURE: 72 MMHG

## 2024-05-01 DIAGNOSIS — S61.210A LACERATION OF RIGHT INDEX FINGER WITHOUT FOREIGN BODY WITHOUT DAMAGE TO NAIL, INITIAL ENCOUNTER: Primary | ICD-10-CM

## 2024-05-01 DIAGNOSIS — M79.641 RIGHT HAND PAIN: Primary | ICD-10-CM

## 2024-05-01 PROCEDURE — 99999 PR PBB SHADOW E&M-EST. PATIENT-LVL IV: CPT | Mod: PBBFAC,,,

## 2024-05-01 PROCEDURE — 99204 OFFICE O/P NEW MOD 45 MIN: CPT | Mod: S$PBB,,,

## 2024-05-01 PROCEDURE — 29125 APPL SHORT ARM SPLINT STATIC: CPT | Mod: PBBFAC,RT

## 2024-05-01 PROCEDURE — 99999PBSHW PR PBB SHADOW TECHNICAL ONLY FILED TO HB: Mod: PBBFAC,,,

## 2024-05-01 PROCEDURE — 99214 OFFICE O/P EST MOD 30 MIN: CPT | Mod: PBBFAC

## 2024-05-02 ENCOUNTER — TELEPHONE (OUTPATIENT)
Dept: ORTHOPEDICS | Facility: CLINIC | Age: 74
End: 2024-05-02
Payer: MEDICARE

## 2024-05-02 ENCOUNTER — ANESTHESIA EVENT (OUTPATIENT)
Dept: SURGERY | Facility: HOSPITAL | Age: 74
End: 2024-05-02
Payer: MEDICARE

## 2024-05-02 RX ORDER — TRAMADOL HYDROCHLORIDE 50 MG/1
50 TABLET ORAL EVERY 6 HOURS PRN
Qty: 10 TABLET | Refills: 0 | Status: SHIPPED | OUTPATIENT
Start: 2024-05-02

## 2024-05-02 NOTE — TELEPHONE ENCOUNTER
Spoke c pts daughter. Informed pt of 7:00 a.m. arrival time for 05/03/24 surgery at the [facility]. Reminded pt of NPO status & PO appt. Pt expressed understanding & was thankful.     Confirmed that pt is NOT taking GLP-1 injectable - mismarked on his Patient Teaching Educ form.

## 2024-05-02 NOTE — ANESTHESIA PREPROCEDURE EVALUATION
05/02/2024  Kishan Dow is a 73 y.o., male.  Pre-operative evaluation for Procedure(s) (LRB):  EXPLORATION, WOUND, UPPER EXTREMITY (Right)    Kishan Dow is a 73 y.o. male   Very [place]  PONV    There is no problem list on file for this patient.      Past Surgical History:   Procedure Laterality Date    CORONARY ANGIOPLASTY WITH STENT PLACEMENT      EXTERNAL EAR SURGERY      Stents in legs         Social History     Socioeconomic History    Marital status:    Tobacco Use    Smoking status: Former    Smokeless tobacco: Never   Substance and Sexual Activity    Alcohol use: No    Drug use: No       No current facility-administered medications on file prior to encounter.     Current Outpatient Medications on File Prior to Encounter   Medication Sig Dispense Refill    amoxicillin-clavulanate 875-125mg (AUGMENTIN) 875-125 mg per tablet Take 1 tablet by mouth 2 (two) times daily. for 10 days 20 tablet 0    aspirin (ECOTRIN) 81 MG EC tablet Take 81 mg by mouth once daily.      azithromycin (Z-ANDREW) 250 MG tablet Take 1 tablet (250 mg total) by mouth once daily. Take first 2 tablets together, then 1 every day until finished. 6 tablet 0    cilostazol (PLETAL) 100 MG Tab Take 50 mg by mouth 2 (two) times daily.      cilostazol (PLETAL) 50 MG Tab Take 50 mg by mouth 2 (two) times daily.      clopidogrel (PLAVIX) 75 mg tablet Take 75 mg by mouth once daily.      gabapentin (NEURONTIN) 300 MG capsule Take 300 mg by mouth 3 (three) times daily.      linagliptin (TRADJENTA) 5 mg Tab tablet Take 5 mg by mouth once daily.      loratadine (CLARITIN) 10 mg tablet Take 1 tablet (10 mg total) by mouth once daily. 60 tablet 0    losartan-hydrochlorothiazide 100-12.5 mg (HYZAAR) 100-12.5 mg Tab Take 1 tablet by mouth once daily. 30 tablet 1    metformin (GLUCOPHAGE) 1000 MG tablet Take 1,000 mg by mouth 2 (two) times  "daily with meals.      metoprolol succinate (TOPROL-XL) 100 MG 24 hr tablet Take 1 tablet (100 mg total) by mouth once daily. 30 tablet 0    nitroGLYCERIN (NITROSTAT) 0.3 MG SL tablet Place 0.3 mg under the tongue every 5 (five) minutes as needed for Chest pain.      omeprazole (PRILOSEC) 40 MG capsule Take 40 mg by mouth once daily.      pioglitazone (ACTOS) 45 MG tablet Take 45 mg by mouth once daily.      promethazine (PHENERGAN) 25 MG tablet Take 1 tablet (25 mg total) by mouth every 6 (six) hours as needed for Nausea. 15 tablet 0    thiamine (VITAMIN B-1) 100 MG tablet Take 100 mg by mouth once daily.         Review of patient's allergies indicates:  No Known Allergies      CBC: No results for input(s): "WBC", "RBC", "HGB", "HCT", "PLT", "MCV", "MCH", "MCHC" in the last 72 hours.    CMP: No results for input(s): "NA", "K", "CL", "CO2", "BUN", "CREATININE", "GLU", "MG", "PHOS", "CALCIUM", "ALBUMIN", "PROT", "ALKPHOS", "ALT", "AST", "BILITOT" in the last 72 hours.    INR  No results for input(s): "PT", "INR", "PROTIME", "APTT" in the last 72 hours.      Diagnostic Studies:    EKG:   Results for orders placed or performed during the hospital encounter of 10/04/21   EKG 12-lead    Collection Time: 10/04/21  7:36 AM    Narrative    Test Reason : R05.9,    Vent. Rate : 091 BPM     Atrial Rate : 091 BPM     P-R Int : 144 ms          QRS Dur : 126 ms      QT Int : 414 ms       P-R-T Axes : 080 103 047 degrees     QTc Int : 509 ms    Normal sinus rhythm  Right bundle branch block  Abnormal ECG  No previous ECGs available  Confirmed by Martín Garzon MD ([id number]) on 10/4/2021 11:15:39 AM    Referred By: [email or web address]   SELF           Confirmed By:Martín Garzon MD       TTE:  No results found for this or any previous visit.  No results found for: "EF"   No results found for this or any previous visit.    CHILANGO:  No results found for this or any previous visit.    Stress Test:  No results found for this or any previous " "visit.       LHC:  No results found for this or any previous visit.       PFT:  No results found for: "FEV1", "FVC", "OEH8AZQ", "TLC", "DLCO"     ALLERGIES:   Review of patient's allergies indicates:  No Known Allergies  LDA:          Lines/Drains/Airways       None                  Anesthesia Evaluation      Airway   Mallampati: II  TM distance: > 6 cm  Neck ROM: Normal ROM  Dental      Pulmonary    Cardiovascular   (+) hypertension    Rate: Normal    Neuro/Psych      GI/Hepatic/Renal      Endo/Other    (+) diabetes mellitus  Abdominal                           Pre-op Assessment    I have reviewed the Patient Summary Reports.     I have reviewed the Nursing Notes. I have reviewed the NPO Status.   I have reviewed the Medications.     Review of Systems  Anesthesia Hx:    PONV           Denies Family Hx of Anesthesia complications.   Personal Hx of Anesthesia complications, Post-Operative Nausea/Vomiting                    Social:  Smoker       Cardiovascular:     Hypertension          PVD                              Pulmonary:  Pulmonary Normal                       Renal/:  Renal/ Normal                 Hepatic/GI:  Hepatic/GI Normal                 Neurological:  Neurology Normal                                      Endocrine:  Diabetes               Physical Exam  General: Well nourished    Airway:  Mallampati: II   Mouth Opening: Normal  TM Distance: > 6 cm  Tongue: Normal  Neck ROM: Normal ROM    Dental:  Mostly missing teeth with a few brown, broken ones remaining; pt understands risk of additional dental trauma and agrees to proceed  Chest/Lungs:  Normal Respiratory Rate    Heart:  Rate: Normal        Anesthesia Plan  Type of Anesthesia, risks & benefits discussed:    Anesthesia Type: MAC  Intra-op Monitoring Plan: Standard ASA Monitors  Post Op Pain Control Plan: multimodal analgesia and IV/PO Opioids PRN  Induction:  IV  Airway Plan: Direct, Post-Induction  Informed Consent: Informed consent signed " with the Patient and all parties understand the risks and agree with anesthesia plan.  All questions answered. Patient consented to blood products? Yes  ASA Score: 3  Day of Surgery Review of History & Physical: H&P Update referred to the surgeon/provider.    Ready For Surgery From Anesthesia Perspective.     .

## 2024-05-03 ENCOUNTER — HOSPITAL ENCOUNTER (OUTPATIENT)
Facility: HOSPITAL | Age: 74
Discharge: HOME | End: 2024-05-03
Attending: ORTHOPAEDIC SURGERY | Admitting: ORTHOPAEDIC SURGERY
Payer: MEDICARE

## 2024-05-03 ENCOUNTER — ANESTHESIA (OUTPATIENT)
Dept: SURGERY | Facility: HOSPITAL | Age: 74
End: 2024-05-03
Payer: MEDICARE

## 2024-05-03 VITALS
DIASTOLIC BLOOD PRESSURE: 67 MMHG | SYSTOLIC BLOOD PRESSURE: 191 MMHG | OXYGEN SATURATION: 97 % | TEMPERATURE: 98 F | WEIGHT: 112 LBS | HEIGHT: 66 IN | RESPIRATION RATE: 15 BRPM | BODY MASS INDEX: 18 KG/M2 | HEART RATE: 74 BPM

## 2024-05-03 DIAGNOSIS — S61.210A LACERATION OF RIGHT INDEX FINGER WITHOUT FOREIGN BODY WITHOUT DAMAGE TO NAIL, INITIAL ENCOUNTER: ICD-10-CM

## 2024-05-03 LAB
POCT GLUCOSE: 118 MG/DL (ref 70–110)
POCT GLUCOSE: 155 MG/DL (ref 70–110)

## 2024-05-03 PROCEDURE — 63600175 PHARM REV CODE 636 W HCPCS: Mod: JZ,JG | Performed by: ORTHOPAEDIC SURGERY

## 2024-05-03 PROCEDURE — 82962 GLUCOSE BLOOD TEST: CPT | Performed by: ORTHOPAEDIC SURGERY

## 2024-05-03 PROCEDURE — 11042 DBRDMT SUBQ TIS 1ST 20SQCM/<: CPT | Mod: ,,, | Performed by: ORTHOPAEDIC SURGERY

## 2024-05-03 PROCEDURE — 25000003 PHARM REV CODE 250: Performed by: ORTHOPAEDIC SURGERY

## 2024-05-03 PROCEDURE — D9220A PRA ANESTHESIA: Mod: ANES,,, | Performed by: ANESTHESIOLOGY

## 2024-05-03 PROCEDURE — 94761 N-INVAS EAR/PLS OXIMETRY MLT: CPT

## 2024-05-03 PROCEDURE — 71000015 HC POSTOP RECOV 1ST HR: Performed by: ORTHOPAEDIC SURGERY

## 2024-05-03 PROCEDURE — 36000706: Performed by: ORTHOPAEDIC SURGERY

## 2024-05-03 PROCEDURE — D9220A PRA ANESTHESIA: Mod: CRNA,,, | Performed by: NURSE ANESTHETIST, CERTIFIED REGISTERED

## 2024-05-03 PROCEDURE — 25000003 PHARM REV CODE 250: Performed by: PHYSICIAN ASSISTANT

## 2024-05-03 PROCEDURE — 25000003 PHARM REV CODE 250: Performed by: NURSE ANESTHETIST, CERTIFIED REGISTERED

## 2024-05-03 PROCEDURE — 71000033 HC RECOVERY, INTIAL HOUR: Performed by: ORTHOPAEDIC SURGERY

## 2024-05-03 PROCEDURE — 27201423 OPTIME MED/SURG SUP & DEVICES STERILE SUPPLY: Performed by: ORTHOPAEDIC SURGERY

## 2024-05-03 PROCEDURE — 99900035 HC TECH TIME PER 15 MIN (STAT)

## 2024-05-03 PROCEDURE — 63600175 PHARM REV CODE 636 W HCPCS: Performed by: NURSE ANESTHETIST, CERTIFIED REGISTERED

## 2024-05-03 PROCEDURE — 36000707: Performed by: ORTHOPAEDIC SURGERY

## 2024-05-03 PROCEDURE — 37000009 HC ANESTHESIA EA ADD 15 MINS: Performed by: ORTHOPAEDIC SURGERY

## 2024-05-03 PROCEDURE — 37000008 HC ANESTHESIA 1ST 15 MINUTES: Performed by: ORTHOPAEDIC SURGERY

## 2024-05-03 RX ORDER — ACETAMINOPHEN 500 MG
1000 TABLET ORAL
Status: COMPLETED | OUTPATIENT
Start: 2024-05-03 | End: 2024-05-03

## 2024-05-03 RX ORDER — HYDROCODONE BITARTRATE AND ACETAMINOPHEN 10; 325 MG/1; MG/1
1 TABLET ORAL EVERY 4 HOURS PRN
Status: DISCONTINUED | OUTPATIENT
Start: 2024-05-03 | End: 2024-05-03 | Stop reason: HOSPADM

## 2024-05-03 RX ORDER — CEFAZOLIN SODIUM 1 G/3ML
INJECTION, POWDER, FOR SOLUTION INTRAMUSCULAR; INTRAVENOUS
Status: DISCONTINUED | OUTPATIENT
Start: 2024-05-03 | End: 2024-05-03

## 2024-05-03 RX ORDER — ONDANSETRON HYDROCHLORIDE 2 MG/ML
4 INJECTION, SOLUTION INTRAVENOUS EVERY 12 HOURS PRN
Status: DISCONTINUED | OUTPATIENT
Start: 2024-05-03 | End: 2024-05-03 | Stop reason: HOSPADM

## 2024-05-03 RX ORDER — PROPOFOL 10 MG/ML
VIAL (ML) INTRAVENOUS CONTINUOUS PRN
Status: DISCONTINUED | OUTPATIENT
Start: 2024-05-03 | End: 2024-05-03

## 2024-05-03 RX ORDER — FENTANYL CITRATE 50 UG/ML
25 INJECTION, SOLUTION INTRAMUSCULAR; INTRAVENOUS EVERY 5 MIN PRN
Status: DISCONTINUED | OUTPATIENT
Start: 2024-05-03 | End: 2024-05-03 | Stop reason: HOSPADM

## 2024-05-03 RX ORDER — ONDANSETRON HYDROCHLORIDE 2 MG/ML
INJECTION, SOLUTION INTRAVENOUS
Status: DISCONTINUED | OUTPATIENT
Start: 2024-05-03 | End: 2024-05-03

## 2024-05-03 RX ORDER — MIDAZOLAM HYDROCHLORIDE 1 MG/ML
INJECTION, SOLUTION INTRAMUSCULAR; INTRAVENOUS
Status: DISCONTINUED | OUTPATIENT
Start: 2024-05-03 | End: 2024-05-03

## 2024-05-03 RX ORDER — BUPIVACAINE HYDROCHLORIDE 2.5 MG/ML
INJECTION, SOLUTION EPIDURAL; INFILTRATION; INTRACAUDAL
Status: DISCONTINUED | OUTPATIENT
Start: 2024-05-03 | End: 2024-05-03 | Stop reason: HOSPADM

## 2024-05-03 RX ORDER — ONDANSETRON HYDROCHLORIDE 2 MG/ML
4 INJECTION, SOLUTION INTRAVENOUS DAILY PRN
Status: DISCONTINUED | OUTPATIENT
Start: 2024-05-03 | End: 2024-05-03 | Stop reason: HOSPADM

## 2024-05-03 RX ORDER — LIDOCAINE HYDROCHLORIDE 20 MG/ML
INJECTION INTRAVENOUS
Status: DISCONTINUED | OUTPATIENT
Start: 2024-05-03 | End: 2024-05-03

## 2024-05-03 RX ORDER — MUPIROCIN 20 MG/G
OINTMENT TOPICAL 2 TIMES DAILY
Status: DISCONTINUED | OUTPATIENT
Start: 2024-05-03 | End: 2024-05-03 | Stop reason: HOSPADM

## 2024-05-03 RX ORDER — FENTANYL CITRATE 50 UG/ML
INJECTION, SOLUTION INTRAMUSCULAR; INTRAVENOUS
Status: DISCONTINUED | OUTPATIENT
Start: 2024-05-03 | End: 2024-05-03

## 2024-05-03 RX ORDER — METOCLOPRAMIDE HYDROCHLORIDE 5 MG/ML
5 INJECTION INTRAMUSCULAR; INTRAVENOUS EVERY 6 HOURS PRN
Status: DISCONTINUED | OUTPATIENT
Start: 2024-05-03 | End: 2024-05-03 | Stop reason: HOSPADM

## 2024-05-03 RX ORDER — LIDOCAINE HYDROCHLORIDE 10 MG/ML
INJECTION, SOLUTION EPIDURAL; INFILTRATION; INTRACAUDAL; PERINEURAL
Status: DISCONTINUED | OUTPATIENT
Start: 2024-05-03 | End: 2024-05-03 | Stop reason: HOSPADM

## 2024-05-03 RX ORDER — PROPOFOL 10 MG/ML
VIAL (ML) INTRAVENOUS
Status: DISCONTINUED | OUTPATIENT
Start: 2024-05-03 | End: 2024-05-03

## 2024-05-03 RX ORDER — FAMOTIDINE 10 MG/ML
INJECTION INTRAVENOUS
Status: DISCONTINUED | OUTPATIENT
Start: 2024-05-03 | End: 2024-05-03

## 2024-05-03 RX ORDER — HYDROCODONE BITARTRATE AND ACETAMINOPHEN 5; 325 MG/1; MG/1
1 TABLET ORAL EVERY 4 HOURS PRN
Status: DISCONTINUED | OUTPATIENT
Start: 2024-05-03 | End: 2024-05-03 | Stop reason: HOSPADM

## 2024-05-03 RX ORDER — ACETAMINOPHEN 325 MG/1
650 TABLET ORAL EVERY 4 HOURS PRN
Status: DISCONTINUED | OUTPATIENT
Start: 2024-05-03 | End: 2024-05-03 | Stop reason: HOSPADM

## 2024-05-03 RX ORDER — BACITRACIN ZINC 500 UNIT/G
OINTMENT (GRAM) TOPICAL
Status: DISCONTINUED | OUTPATIENT
Start: 2024-05-03 | End: 2024-05-03 | Stop reason: HOSPADM

## 2024-05-03 RX ORDER — SODIUM CHLORIDE 0.9 % (FLUSH) 0.9 %
10 SYRINGE (ML) INJECTION
Status: DISCONTINUED | OUTPATIENT
Start: 2024-05-03 | End: 2024-05-03 | Stop reason: HOSPADM

## 2024-05-03 RX ADMIN — CEFAZOLIN 2 G: 330 INJECTION, POWDER, FOR SOLUTION INTRAMUSCULAR; INTRAVENOUS at 08:05

## 2024-05-03 RX ADMIN — MIDAZOLAM HYDROCHLORIDE 2 MG: 2 INJECTION, SOLUTION INTRAMUSCULAR; INTRAVENOUS at 08:05

## 2024-05-03 RX ADMIN — SODIUM CHLORIDE: 9 INJECTION, SOLUTION INTRAVENOUS at 08:05

## 2024-05-03 RX ADMIN — LIDOCAINE HYDROCHLORIDE 50 MG: 20 INJECTION INTRAVENOUS at 08:05

## 2024-05-03 RX ADMIN — FAMOTIDINE 20 MG: 10 INJECTION, SOLUTION INTRAVENOUS at 08:05

## 2024-05-03 RX ADMIN — PROPOFOL 100 MCG/KG/MIN: 10 INJECTION, EMULSION INTRAVENOUS at 08:05

## 2024-05-03 RX ADMIN — ONDANSETRON 4 MG: 2 INJECTION INTRAMUSCULAR; INTRAVENOUS at 08:05

## 2024-05-03 RX ADMIN — PROPOFOL 50 MG: 10 INJECTION, EMULSION INTRAVENOUS at 08:05

## 2024-05-03 RX ADMIN — FENTANYL CITRATE 25 MCG: 50 INJECTION, SOLUTION INTRAMUSCULAR; INTRAVENOUS at 08:05

## 2024-05-03 RX ADMIN — ACETAMINOPHEN 1000 MG: 500 TABLET ORAL at 08:05

## 2024-05-03 RX ADMIN — FENTANYL CITRATE 50 MCG: 50 INJECTION, SOLUTION INTRAMUSCULAR; INTRAVENOUS at 08:05

## 2024-05-03 NOTE — PLAN OF CARE
VSS.  Patient tolerating oral liquids without difficulty.   No apparent s&s of distress noted at this time, no complaints voiced at this time.   Discharge instructions reviewed with patient and daughter with good verbal feedback.  Post op medications reviewed. Patient ready for discharge.

## 2024-05-03 NOTE — ANESTHESIA POSTPROCEDURE EVALUATION
Anesthesia Post Evaluation    Patient: Kishan Dow    Procedure(s) Performed: Procedure(s) (LRB):  EXPLORATION, WOUND, UPPER EXTREMITY (Right)  DEBRIDEMENT, WOUND (Right)    Final Anesthesia Type: general      Patient location during evaluation: PACU  Patient participation: Yes- Able to Participate  Level of consciousness: awake and alert  Post-procedure vital signs: reviewed and stable  Pain management: adequate  Airway patency: patent    PONV status at discharge: No PONV  Anesthetic complications: no      Cardiovascular status: hemodynamically stable  Respiratory status: spontaneous ventilation  Follow-up not needed.              Vitals Value Taken Time   /56 05/03/24 0947   Temp 36.7 °C (98 °F) 05/03/24 0930   Pulse 64 05/03/24 0958   Resp 16 05/03/24 0958   SpO2 100 % 05/03/24 0958   Vitals shown include unfiled device data.      No case tracking events are documented in the log.      Pain/Britt Score: Pain Rating Prior to Med Admin: 0 (5/3/2024  8:18 AM)  Britt Score: 5 (5/3/2024  9:36 AM)

## 2024-05-03 NOTE — OP NOTE
Orthopedic Surgery Operative Note     Date of Procedure: 5/3/2024     Procedure:  Right index finger irrigation and debridement involving skin, subcutaneous tissue, neurovascular structures, tendon sheath:  4 x 1 cm    Surgeons:  Surgeons and Role:     * Mayi Smith MD - Primary    Assisting Surgeon: None    Pre-Operative Diagnosis:  Right index finger dog bite with wound dehiscence    Post-Operative Diagnosis:  Right index finger dog bite with wound dehiscence    Anesthesia: Local MAC    Findings of the Procedure:  No evidence of infection, necrosis, intra-articular or tendon sheath penetration.  Neurovascular bundle intact.    Complications: No    Estimated Blood Loss (EBL): minimal    Specimens: None     Tourniquet time: 26 mins           Condition: Good    Disposition: PACU - hemodynamically stable.    Indications for Procedure:  Kishan Dow is a 73 y.o. male who suffered a dog bite to his right index finger 1 week ago while breaking up a dog fight.  At the time of injury denied any pulsatile bleeding or foreign body.  Presented to ER immediately after and underwent irrigation and debridement with revision wound closure.  Was given Unasyn and sent home with Augmentin for which he endorsed compliance with.  Presented to our clinic with no evidence of infection, tendon/neurovascularly intact, but inappropriate wound healing suggestive of dehiscence.  Therefore recommended operative intervention including irrigation debridement with wound revision closure. We discussed the risks and benefits of surgical intervention including but not limited to pain, infection, bleeding, damage to surrounding structures, iatrogenic fracture, septic arthritis, flexor tenosynovitis, posttraumatic osteoarthritis, wound healing complications, amputation, and need for further interventions. They wish to proceed with surgery.  Informed and written consent was obtained in clinic prior to proceeding.    Procedure in  Detail:  After the correct site was marked with the patient's participation in the holding area, the patient underwent MAC anesthesia. They were brought to the Operating room and placed in the supine position. A well-padded nonsterile tourniquet was placed on the upper extremity. The upper extremity was prepped and draped in a normal sterile fashion. Formal timeout was performed confirming the correct patient, site, and procedure. IV antibiotics were given to the patient preoperatively.  1% lidocaine was administered for local block.  After confirming adequate anesthesia, the sutures were then removed.  Extremity was exsanguinated excluding the index finger and the tourniquet was inflated to 250 mm of mercury.  There was a 1 x 2 cm transverse laceration over the volar DIPJ of the index finger.  A late L was used to bluntly dissect the subcutaneous tissues.  There was no evidence of purulence, foreign body, or necrosis.  The radial neurovascular bundle was visualized and appeared more midline. Exploration of this proximally and distally appeared to be intact.  There was no joint penetration or penetration through the tendon sheath, or tenosynovitis.  The wound edges were then sharply debrided to healthy bleeding tissue with Littler's.  We then turned our attention to another 2 cm transverse laceration over the volar P2 just distal to the other volar laceration.  In his similar fashion L were used to bluntly dissect through subcutaneous tissues.  Again there was no evidence of purulence, foreign body, or necrosis.  This appeared to not penetrate deep to the level of the flexor tendon.  In his similar fashion the wound edges were then sharply debrided to healthy bleeding tissue with Littler's.  It was also a 1 cm longitudinal superficial laceration over the dorsal P2 that was also explored and had no evidence of necrosis, foreign body, infection.  The edges of this wound appeared to be pink and healthy.  The wounds  were debrided with a combination of curette and frictional debridement with a Ray-Deepak.  All the wounds were then subsequently irrigated with copious amounts of normal saline.  4-0 nylon was used to close the skin.  10 cc of 0.25% Marcaine was injected for postop digital block.  Sterile dressing with Adaptic bacitracin, 4x4s, cast padding was applied. Nonsterile radial gutter splint and ACE bandage was applied. Tourniquet was deflated.  Brisk capillary refill ensued.The patient tolerated the procedure well and was brought to Recovery Area in stable condition.       Postoperative Plan:  Keep the dressing clean, dry, and intact. Patient will follow-up in two weeks time wound check and suture removal.  Light use of the hand.  Once incision has healed, referred to OT to work on passive and active range of motion with standard precautions.     Scheduled f/us: 2 weeks, 6 weeks, 3 months     Xrays at subsequent f/us: none

## 2024-05-03 NOTE — INTERVAL H&P NOTE
The patient has been examined and the H&P has been reviewed:    I concur with the findings and no changes have occurred since H&P was written.    Surgery risks, benefits and alternative options discussed and understood by patient/family.          There are no hospital problems to display for this patient.     Stel[place] Counseling:  I discussed with the patient the risks of ustekinumab including but not limited to immunosuppression, malignancy, posterior leukoencephalopathy syndrome, and serious infections.  The patient understands that monitoring is required including a PPD at baseline and must alert us or the primary physician if symptoms of infection or other concerning signs are noted.

## 2024-05-03 NOTE — TRANSFER OF CARE
"Anesthesia Transfer of Care Note    Patient: Kishan Dow    Procedure(s) Performed: Procedure(s) (LRB):  EXPLORATION, WOUND, UPPER EXTREMITY (Right)  DEBRIDEMENT, WOUND (Right)    Patient location: PACU    Anesthesia Type: general    Transport from OR: Transported from OR on 100% O2 by closed face mask with adequate spontaneous ventilation    Post pain: adequate analgesia    Post assessment: no apparent anesthetic complications and tolerated procedure well    Post vital signs: stable    Level of consciousness: sedated and responds to stimulation    Nausea/Vomiting: no nausea/vomiting    Complications: none    Transfer of care protocol was followed    Last vitals: Visit Vitals  BP (!) 193/77 (BP Location: Left arm, Patient Position: Lying)   Pulse (!) 18   Temp 36.8 °C (98.2 °F) (Temporal)   Resp 18   Ht 5' 6" (1.676 m)   Wt 50.8 kg (112 lb)   SpO2 98%   BMI 18.08 kg/m²     "

## 2024-05-03 NOTE — BRIEF OP NOTE
[facility] - Surgery (Hospital)  Brief Operative Note    Surgery Date: 5/3/2024     Surgeons and Role:     * Mayi Smith MD - Primary    Assisting Surgeon: None    Pre-op Diagnosis:  Right hand pain [M79.641]    Post-op Diagnosis:  Post-Op Diagnosis Codes:     * Right hand pain [M79.641]    Procedure(s) (LRB):  EXPLORATION, WOUND, UPPER EXTREMITY (Right)  DEBRIDEMENT, WOUND (Right)    Anesthesia: Local MAC    Operative Findings: see op note    Estimated Blood Loss: * No values recorded between 5/3/2024  8:53 AM and 5/3/2024  9:29 AM *         Specimens:   Specimen (24h ago, onward)      None              Discharge Note    OUTCOME: Patient tolerated treatment/procedure well without complication and is now ready for discharge.    DISPOSITION: Home or Self Care    FINAL DIAGNOSIS:  Laceration of right index finger without foreign body without damage to nail    FOLLOWUP: In clinic    DISCHARGE INSTRUCTIONS:    Discharge Procedure Orders   Diet general     Sponge bath only until clinic visit     Keep surgical extremity elevated     Lifting restrictions   Order Comments: No lifting >5lbs     No driving, operating heavy equipment or signing legal documents while taking pain medication.     Leave dressing on - Keep it clean, dry, and intact until clinic visit     Call MD for:  temperature >100.4     Call MD for:  persistent nausea and vomiting     Call MD for:  severe uncontrolled pain     Call MD for:  difficulty breathing, headache or visual disturbances     Call MD for:  redness, tenderness, or signs of infection (pain, swelling, redness, odor or green/yellow discharge around incision site)     Call MD for:  hives     Call MD for:  persistent dizziness or light-headedness     Call MD for:  extreme fatigue     Shower on day dressing removed (No bath)

## 2024-05-03 NOTE — PLAN OF CARE
Pre op checklist complete. Pt resting in bed with call light in reach. All questions answered. Pt belongings at bedside and plan for daughter to hold.

## 2024-05-07 NOTE — PROGRESS NOTES
Mr. Dow is here today for a post-operative visit.  He is 7 days status post right index finger dog bite with wound dehiscence by Dr. Smith on 05/03/2024. He reports that he is well.  Pain is 0/10.  He is not taking pain medication .  He denies fever, chills, and sweats since the time of the surgery.  Denies any numbness or tingling    Physical exam:    There were no vitals filed for this visit.  Vital signs are stable, patient is afebrile.  Patient is well dressed and well groomed, no acute distress.  Alert and oriented to person, place, and time.  Post op dressing taken down.  Incision is clean, dry and intact.  There is no erythema or exudate.  There is no sign of any infection. He is NVI. Sutures intact.           Assessment:   s/p  right index finger dog bite with wound dehiscence  Kishan was seen today for post-op evaluation and finger injury.    Diagnoses and all orders for this visit:    Post-operative state  -     Ambulatory referral/consult to Physical/Occupational Therapy; Future    Laceration of right index finger without foreign body without damage to nail, initial encounter  -     Ambulatory referral/consult to Physical/Occupational Therapy; Future         Plan :   Patient will follow-up in 1 week for suture removal.  We will reapplied radioulnar gutter splint.  Therapy ordered  to begin following next week visit after suture removal.   Continue hand elevation up on pillow.

## 2024-05-09 ENCOUNTER — TELEPHONE (OUTPATIENT)
Dept: ORTHOPEDICS | Facility: CLINIC | Age: 74
End: 2024-05-09
Payer: MEDICARE

## 2024-05-10 ENCOUNTER — OFFICE VISIT (OUTPATIENT)
Dept: ORTHOPEDICS | Facility: CLINIC | Age: 74
End: 2024-05-10
Payer: MEDICARE

## 2024-05-10 VITALS — BODY MASS INDEX: 18 KG/M2 | HEIGHT: 66 IN | WEIGHT: 112 LBS

## 2024-05-10 DIAGNOSIS — S61.210A LACERATION OF RIGHT INDEX FINGER WITHOUT FOREIGN BODY WITHOUT DAMAGE TO NAIL, INITIAL ENCOUNTER: ICD-10-CM

## 2024-05-10 DIAGNOSIS — Z98.890 POST-OPERATIVE STATE: Primary | ICD-10-CM

## 2024-05-10 PROCEDURE — 99999 PR PBB SHADOW E&M-EST. PATIENT-LVL IV: CPT | Mod: PBBFAC,,,

## 2024-05-10 PROCEDURE — 99214 OFFICE O/P EST MOD 30 MIN: CPT | Mod: PBBFAC

## 2024-05-10 PROCEDURE — 99024 POSTOP FOLLOW-UP VISIT: CPT | Mod: POP,,,

## 2024-05-16 ENCOUNTER — TELEPHONE (OUTPATIENT)
Dept: ORTHOPEDICS | Facility: CLINIC | Age: 74
End: 2024-05-16
Payer: MEDICARE

## 2024-05-17 ENCOUNTER — OFFICE VISIT (OUTPATIENT)
Dept: ORTHOPEDICS | Facility: CLINIC | Age: 74
End: 2024-05-17
Payer: MEDICARE

## 2024-05-17 VITALS — BODY MASS INDEX: 18 KG/M2 | HEIGHT: 66 IN | WEIGHT: 112 LBS

## 2024-05-17 DIAGNOSIS — S61.210A LACERATION OF RIGHT INDEX FINGER WITHOUT FOREIGN BODY WITHOUT DAMAGE TO NAIL, INITIAL ENCOUNTER: ICD-10-CM

## 2024-05-17 DIAGNOSIS — Z98.890 POST-OPERATIVE STATE: Primary | ICD-10-CM

## 2024-05-17 PROCEDURE — 99024 POSTOP FOLLOW-UP VISIT: CPT | Mod: POP,,,

## 2024-05-17 PROCEDURE — 99999 PR PBB SHADOW E&M-EST. PATIENT-LVL III: CPT | Mod: PBBFAC,,,

## 2024-05-17 PROCEDURE — 99213 OFFICE O/P EST LOW 20 MIN: CPT | Mod: PBBFAC

## 2024-05-17 NOTE — PROGRESS NOTES
"Interval HPI 05/17/2024  Patient is here for postoperative visit.  He is 14 days status post right index finger dog bite with wound dehiscence by Dr. Brad barrett on 05/03/2024.  He denies any pain or numbness tingling.  He reports.  He denies any fever chills or sweats since the time of surgery.       Interval HPI 05/10/2024  Mr. Dow is here today for a post-operative visit.  He is 7 days status post right index finger dog bite with wound dehiscence by Dr. Smith on 05/03/2024. He reports that he is well.  Pain is 0/10.  He is not taking pain medication .  He denies fever, chills, and sweats since the time of the surgery.  Denies any numbness or tingling    Physical exam:    Vitals:    05/17/24 1349   Weight: 50.8 kg (111 lb 15.9 oz)   Height: 5' 6" (1.676 m)   PainSc: 0-No pain     Vital signs are stable, patient is afebrile.  Patient is well dressed and well groomed, no acute distress.  Alert and oriented to person, place, and time.  Post op dressing taken down.  Incision is clean, dry and intact.  There is no erythema or exudate.  There is no sign of any infection. He is NVI. Sutures were removed without any difficulty.  Patient was unable to make a composite fist.  Noted moderate swelling of right index finger.           Assessment:   s/p  right index finger dog bite with wound dehiscence  There are no diagnoses linked to this encounter.   Kishan was seen today for post-op evaluation, finger injury and swelling.    Diagnoses and all orders for this visit:    Post-operative state    Laceration of right index finger without foreign body without damage to nail, initial encounter         Plan :   Postoperative instructions given to patient this visit  Discussed and educated patient about scar massage to do 3 or 4 times a day.   Therapy was ordered last visit to help with range of motion.  He has therapy scheduled in the future.  Discussed range-of-motion exercises with patient and his daughter.  Handout was " given today for resource.  Discussed with patient slow transition into weight-bearing.  He will follow-up in 6 weeks.    We will have patient follow up in 6 weeks for range-of-motion check

## 2024-05-20 ENCOUNTER — CLINICAL SUPPORT (OUTPATIENT)
Dept: REHABILITATION | Facility: HOSPITAL | Age: 74
End: 2024-05-20
Payer: MEDICARE

## 2024-05-20 DIAGNOSIS — R60.0 LOCALIZED EDEMA: ICD-10-CM

## 2024-05-20 DIAGNOSIS — R53.1 WEAKNESS: ICD-10-CM

## 2024-05-20 DIAGNOSIS — M25.60 STIFFNESS IN JOINT: Primary | ICD-10-CM

## 2024-05-20 DIAGNOSIS — M79.644 PAIN IN FINGER OF RIGHT HAND: ICD-10-CM

## 2024-05-20 DIAGNOSIS — S61.210A LACERATION OF RIGHT INDEX FINGER WITHOUT FOREIGN BODY WITHOUT DAMAGE TO NAIL, INITIAL ENCOUNTER: ICD-10-CM

## 2024-05-20 DIAGNOSIS — Z98.890 POST-OPERATIVE STATE: ICD-10-CM

## 2024-05-20 PROBLEM — R60.9 EDEMA: Status: ACTIVE | Noted: 2024-05-20

## 2024-05-20 PROCEDURE — 97530 THERAPEUTIC ACTIVITIES: CPT | Mod: PO

## 2024-05-20 PROCEDURE — 97165 OT EVAL LOW COMPLEX 30 MIN: CPT | Mod: PO

## 2024-05-20 NOTE — PLAN OF CARE
"  [facility] Occupational Therapy  Initial Evaluation     Date: 5/20/2024  Patient: Kishan Dow  Chart Number: [id number]  Referring Physician: Sandra Jj NP  Therapy Diagnosis:   1. Stiffness in joint        2. Post-operative state  Ambulatory referral/consult to Physical/Occupational Therapy      3. Laceration of right index finger without foreign body without damage to nail, initial encounter  Ambulatory referral/consult to Physical/Occupational Therapy      4. Weakness        5. Pain in finger of right hand        6. Localized edema            Physician Orders: OT eval/treat   Medical Diagnosis:   Right index finger irrigation and debridement involving skin, subcutaneous tissue, neurovascular structures, tendon sheath:  4 x 1 cm   Evaluation Date: 5/20/2024  Plan of Care Certification Date: 8/16/24  Authorization Period: 5/10/24 - 5/10/25  Surgery Date and Procedure:  5/3/24, Right index finger irrigation and debridement involving skin, subcutaneous tissue, neurovascular structures, tendon sheath:  4 x 1 cm   Date of Return to MD: 6/14/24    Visit #: 1 of 1  Time In: 1100  Time Out: 1200  Total Billable Time: 60    Precautions: Standard ,    Subjective     History of Current Condition: Pt states that he was  a dog fight when he got bite by one of the dogs. He went to the ER on 4/25/24 where he was treated for the wound and then was referred to a hand surgeon. MD Weber preformed a a ringht index finger irrigation and debridement involving skin, subcutaneous tissue, neurovascular structures and tendon sheath on 5//3/24    Involved Side: R  Dominant Side: R  Date of Onset: 4/25/24  Imaging: reviewed  Previous Therapy: N    Patient's Goals for Therapy: "To have full motion of his hand"     Pain:  Functional Pain Scale Rating 0-10:   2/10 on average  2/10 at best  5/10 at worst  Location: PIP joint of thumb  Description: aching, tight  Aggravating Factors: bending his " finger   Easing Factors: not moving his finger    Occupation:  Retired  Working presently: n/a  Duties: n/a    Functional Limitations/Social History:    Prior Level of Function: Independent  Current Level of Function:MOD A     Home/Living environment : lives with their family  Home Access: accessible  DME: none     Leisure: n/a    Driving: N    Past Medical History/Physical Systems Review:   Kishan Dow  has a past medical history of Anticoagulant long-term use, Coronary artery disease, Diabetes mellitus, High cholesterol, and Hypertension.    Kishan Dow  has a past surgical history that includes Coronary angioplasty with stent; External ear surgery; Stents in legs; exploration, wound, upper extremity (Right, 5/3/2024); and Wound debridement (Right, 5/3/2024).    Kishan has a current medication list which includes the following prescription(s): aspirin, cilostazol, cilostazol, clopidogrel, gabapentin, linagliptin, loratadine, losartan-hydrochlorothiazide 100-12.5 mg, metformin, metoprolol succinate, nitroglycerin, omeprazole, pioglitazone, promethazine, thiamine, and tramadol, and the following Facility-Administered Medications: ceFAZolin 2 g in dextrose 5 % in water (D5W) 50 mL IVPB (MB+).    Review of patient's allergies indicates:  No Known Allergies       Objective     Mental status: alert, oriented x3    Observation:   Pt incision is closed with no signs of infection. His index and long finger have moderate swelling       Sensation: Pt denies any numbness or tingling  Edema: Circumferential measurements: In centimters     5/20/2024 5/20/2024    Left Right   Index:       P1 6.3 7.8   P2 5.6 7   P3 5 5.5     Left Hand Finger ROM INDEX   5/20/2024 LONG   5/20/2024   MP  65 85   PIP  105 110   DIP  75 80     Right Hand Finger ROM INDEX   5/20/2024 LONG   5/20/2024   MP  50 70   PIP  60 90   DIP  20 60      Strength: (SANAZ Dynamometer in psi.)      5/20/2024 5/20/2024    Left Right   Rung II 65 35        Pinch Strength (Measured in psi)     5/20/2024 5/20/2024    Left Right   Key Pinch 10  7   3pt Pinch 10 3   2pt Pinch 7 2       CMS Impairment/Limitation/Restriction for FOTO Hand/Wrist/Finger Survey    Therapist reviewed FOTO scores for Kishan Dow on 5/20/2024.   FOTO documents entered into [employer] - see Media section.    Intake Score: 41%  Category: Self Care         Treatment     Treatment Time In: 1145  Treatment Time Out: 1200  Total Treatment time separate from Evaluation time:15    Kishan participated in dynamic functional therapeutic activities to improve functional performance for 15  minutes, including:  -demonstration and explanation of HEP    Home Exercise Program/Education:  Issued HEP (see patient instructions in EMR) and educated on modality use for pain management . Exercises were reviewed and Kishan was able to demonstrate them prior to the end of the session.   Pt received a written copy of exercises to perform at home. Kishan demonstrated good  understanding of the education provided.  Pt was advised to perform these exercises free of pain, and to stop performing them if pain occurs.    Patient/Family Education: role of OT, goals for OT, scheduling/cancellations - pt verbalized understanding. Discussed insurance limitations with patient.    Assessment     Kishan Dow is a 73 y.o. male presents with limitations as described in problem list. Patient can benefit from Occupational Therapy services for Iontophoresis, ultrasound, moist heat, therapeutic exercises, home exercise program provied with written instructions, ice and strengthening and orthotics, if deemed necessary . The following goals were discussed with the patient and she is in agreement with them as to be addressed in the treatment plan.    The patient's rehab potential is Good.     Anticipated barriers to occupational therapy: patient's hesitancy to move finger due to swelling and pain  Pt has no cultural, educational or  language barriers to learning provided.    Profile and History Assessment of Occupational Performance Level of Clinical Decision Making Complexity Score   Occupational Profile:   Kishan Dow is a 73 y.o. male who is currently employed Kishan Dow has difficulty with  ADLs and IADLs as listed previously, which  affecting his/her daily functional abilities.      Comorbidities:    has a past medical history of Anticoagulant long-term use, Coronary artery disease, Diabetes mellitus, High cholesterol, and Hypertension.    Medical and Therapy History Review:   Expanded               Performance Deficits    Physical:  Joint Mobility  Joint Stability  Muscle Power/Strength   Strength  Pinch Strength  Pain    Cognitive:  No Deficits    Psychosocial:    Habits  Routines  Rituals     Clinical Decision Making:  low    Assessment Process:  Problem-Focused Assessments    Modification/Need for Assistance:  Minimal-Moderate Modifications/Assistance    Intervention Selection:  Several Treatment Options       low  Based on PMHX, co morbidities , data from assessments and functional level of assistance required with task and clinical presentation directly impacting function.         Goals:    LTG's (12 weeks):  1)   Increase ROM in MP joint of index finger to 65 degrees to increase functional hand use for ADLs and IADLs  2)   Increase ROM in PIP joint of index finger to 100 degrees to increase functional hand use for ADLs and IADLs  3)   Increase ROM in DIP joint of index finger to 60 degrees to increase functional hand use for ADLs and IADLs  4)   Increase  strength to 60 lbs. to grasp for ADLs and iADls  5)   Increase key pinch to 10 psis to increase independence with button and FM Coordination.   6)   Decrease complaints of pain to  3 out of 10 at worst to increase functional hand use for ADL/work/leisure activities.  7)   Patient to score at 77% or more on FOTO to demonstrate improved perception of functional  hand use.  8)   Pt will return to near to prior level of function for ADLs and household management reporting I or Mod I with ADLs (dressing, feeding, grooming, toileting).     STG's (6 weeks)  1)   Patient to be IND with HEP and modalities for pain/edema managment.  2)   Increase ROM in MP joint of index finger to 60 degrees to increase functional hand use for ADLs and IADLs  3)   Increase ROM in PIP joint of index finger to 80 degrees to increase functional hand use for ADLs and IADLs  4)   Increase ROM in DIP joint of index finger to 40 degrees to increase functional hand use for ADLs and IADLs  5)   Increase  strength to 45 lbs. to grasp for ADLs and iADls  6)   Increase key pinch to 8 psis to increase independence with button and FM Coordination.   7)   Decrease complaints of pain to  3 out of 10 at worst to increase functional hand use for ADL/work/leisure activities.  8)   Patient to be IND wiht Orthotic use, wear and care precautions.   9)   Decrease complaints of pain to  1 out of 10 at worst to increase functional hand use for ADL/work/leisure activities.          Plan     Pt to be treated by Occupational Therapy 2 times per week for 12 weeks during the certification period from 5/20/2024 to 8/16/24 to achieve the established goals.     Treatment to include: Paraffin, Fluidotherapy, Manual therapy/joint mobilizations, Modalities for pain management, US 3 mhz, Therapeutic exercises/activities., Iontophoresis with 2.0 cc Dexamethasone, Strengthening, Orthotic Fabrication/Fit/Training, Edema Control, Scar Management, Wound Care, Electrical Modalities, Joint Protection, and Energy Conservation, as well as any other treatments deemed necessary based on the patient's needs or progress.     CLAUDIA Hunt

## 2024-05-20 NOTE — PATIENT INSTRUCTIONS
"[facility], OCCUPATIONAL THERAPY  HOME EXERCISE PROGRAM   AROM: DIP Flexion / Extension  Pinch middle knuckle to prevent bending.   Bend end knuckle until stretch is felt. Hold   3 seconds. Relax. Straighten finger as far as possible.      AROM: PIP Flexion / Extension  Pinch bottom knuckle  to prevent bending.   Actively bend middle knuckle until stretch is felt.   Hold 3 seconds. Relax. Straighten finger as far as possible.    AROM: Isolated MCP Flexion / Extension ("Wave")   Bend only your large, bottom knuckles. Hold 3 seconds.   Keep the tips of your fingers straight. Straighten fingers.      AROM: Isolated IPJ Flexion / Extension ("Hook")   Bend only your middle and end knuckles. Hold 3 seconds.   Straighten your fingers.       AROM: MCP and PIP Flexion / Extension ("Straight Fist")  Bend your bottom and middle knuckles, keeping the tips of your fingers straight.   Try to touch the pads of your fingers on your palm. Hold 3 seconds.   Straighten your fingers.       AROM: Composite Flexion / Extension ("Full Fist")  Bend every joint in your hand into a fist. Hold 3 seconds.   Straighten your fingers.         AROM: Composte Extension ("Finger Lifts")  Lift your finger off of the table one at a time. Hold 3 seconds.   Relax your finger.      AROM: Abduction / Adduction  With hand flat on table, spread all fingers apart,   then bring them together as close as possible.        Therapist: CLAUDIA Hunt       "

## 2024-05-27 ENCOUNTER — CLINICAL SUPPORT (OUTPATIENT)
Dept: REHABILITATION | Facility: HOSPITAL | Age: 74
End: 2024-05-27
Payer: MEDICARE

## 2024-05-27 DIAGNOSIS — R60.0 LOCALIZED EDEMA: ICD-10-CM

## 2024-05-27 DIAGNOSIS — M25.60 STIFFNESS IN JOINT: Primary | ICD-10-CM

## 2024-05-27 DIAGNOSIS — M79.644 PAIN IN FINGER OF RIGHT HAND: ICD-10-CM

## 2024-05-27 DIAGNOSIS — R53.1 WEAKNESS: ICD-10-CM

## 2024-05-27 PROCEDURE — 97530 THERAPEUTIC ACTIVITIES: CPT | Mod: PO

## 2024-05-27 PROCEDURE — 97018 PARAFFIN BATH THERAPY: CPT | Mod: PO

## 2024-05-27 NOTE — PROGRESS NOTES
Occupational Therapy Daily Treatment Note     Name: Kishan Dow  Clinic Number: [id number]    Therapy Diagnosis:   Encounter Diagnoses   Name Primary?    Stiffness in joint Yes    Weakness     Pain in finger of right hand     Localized edema      Physician: Sandra Jj NP    Visit Date: 5/27/2024    Physician Orders: OT eval/treat   Medical Diagnosis:   Right index finger irrigation and debridement involving skin, subcutaneous tissue, neurovascular structures, tendon sheath:  4 x 1 cm   Evaluation Date: 5/20/2024  Plan of Care Certification Date: 8/16/24  Authorization Period: 5/10/24 - 5/10/25  Surgery Date and Procedure:  5/3/24, Right index finger irrigation and debridement involving skin, subcutaneous tissue, neurovascular structures, tendon sheath:  4 x 1 cm   Date of Return to MD: 6/14/24  Onset Date: 4/25/24  Visit # / Visits authorized: 1 / 20  FOTO Completion: 1/3    Time In:1100  Time Out: 1200  Total Billable Time: 60 minutes    Precautions:  Standard      Subjective     Pt reports: He states he is completing his exercise  he was compliant with home exercise program given last session.   Response to previous treatment:1st after  Functional change: 1st after     Pain: 2/10  Location: right hands      Objective          Mental status: alert, oriented x3     Observation:   Pt incision is closed with no signs of infection. His index and long finger have moderate swelling         Sensation: Pt denies any numbness or tingling  Edema: Circumferential measurements: In centimters       5/20/2024 5/20/2024     Left Right   Index:         P1 6.3 7.8   P2 5.6 7   P3 5 5.5      Left Hand Finger ROM INDEX   5/20/2024 LONG   5/20/2024   MP  65 85   PIP  105 110   DIP  75 80      Right Hand Finger ROM INDEX   5/20/2024 LONG   5/20/2024   MP  50 70   PIP  60 90   DIP  20 60       Strength: (SANAZ Dynamometer in psi.)        5/20/2024 5/20/2024     Left Right   Rung II 65 35         Pinch Strength  (Measured in psi)       5/20/2024 5/20/2024     Left Right   Key Pinch 10  7   3pt Pinch 10 3   2pt Pinch 7 2         CMS Impairment/Limitation/Restriction for FOTO Hand/Wrist/Finger Survey     Therapist reviewed FOTO scores for Kishan Dow on 5/20/2024.   FOTO documents entered into [employer] - see Media section.     Intake Score: 41%  Category: Self Care             Treatment     Kishan received the following supervised modalities after being cleared for contraindications for 15 minutes:   -paraffin wax and moist hot pack to increase extensibility     Kishan participated in dynamic functional therapeutic activities to improve functional performance for 45  minutes, including:  - PROM joint blocking to index finger MP, PIP, and DIP joints  - AROM joint blocking to index finger MP, PIP, and DIP joints   - TGE's x 20  - composite fist x 20  - finger lifts x 20  - finger spreads x 20  - isospheres x 3 minutes   - pink putty dig outs   - pom pom  with yellow clothes pin   - pom pom  with opposition between fingers  - pink putty  x 3    Home Exercises and Education Provided     Education provided:   - Progress towards goals     Written Home Exercises Provided: Patient instructed to cont prior HEP.  Exercises were reviewed and Kishan was able to demonstrate them prior to the end of the session.  Kishan demonstrated good  understanding of the HEP provided.   .   See EMR under Patient Instructions for exercises provided prior visit.        Assessment     Pt would continue to benefit from skilled OT. Pt tolerated added therapeutic activities well without additional complaints of pain. Pt is motivated. Will progress as tolerated.      Kishan is progressing well towards his goals and there are no updates to goals at this time. Pt prognosis is Good.     Pt will continue to benefit from skilled outpatient occupational therapy to address the deficits listed in the problem list on initial evaluation provide  pt/family education and to maximize pt's level of independence in the home and community environment.     Anticipated barriers to occupational therapy: none at this time    Pt's spiritual, cultural and educational needs considered and pt agreeable to plan of care and goals.    Goals:    LTG's (12 weeks):  1)   Increase ROM in MP joint of index finger to 65 degrees to increase functional hand use for ADLs and IADLs Ongoing  2)   Increase ROM in PIP joint of index finger to 100 degrees to increase functional hand use for ADLs and IADLsOngoing  3)   Increase ROM in DIP joint of index finger to 60 degrees to increase functional hand use for ADLs and IADLsOngoing  4)   Increase  strength to 60 lbs. to grasp for ADLs and iADlsOngoing  5)   Increase key pinch to 10 psis to increase independence with button and FM Coordination. Ongoing  6)   Decrease complaints of pain to  3 out of 10 at worst to increase functional hand use for ADL/work/leisure activities.Ongoing  7)   Patient to score at 77% or more on FOTO to demonstrate improved perception of functional hand use.Ongoing  8)   Pt will return to near to prior level of function for ADLs and household management reporting I or Mod I with ADLs (dressing, feeding, grooming, toileting). Ongoing     STG's (6 weeks)  1)   Patient to be IND with HEP and modalities for pain/edema managment.Ongoing  2)   Increase ROM in MP joint of index finger to 60 degrees to increase functional hand use for ADLs and IADLsOngoing  3)   Increase ROM in PIP joint of index finger to 80 degrees to increase functional hand use for ADLs and IADLsOngoing  4)   Increase ROM in DIP joint of index finger to 40 degrees to increase functional hand use for ADLs and IADLsOngoing  5)   Increase  strength to 45 lbs. to grasp for ADLs and iADlsOngoing  6)   Increase key pinch to 8 psis to increase independence with button and FM Coordination. Ongoing  7)   Decrease complaints of pain to  3 out of 10 at  worst to increase functional hand use for ADL/work/leisure activities.Ongoing  8)   Patient to be IND wiht Orthotic use, wear and care precautions. Ongoing  9)   Decrease complaints of pain to  1 out of 10 at worst to increase functional hand use for ADL/work/leisure activities.Ongoing              Plan      Pt to be treated by Occupational Therapy 2 times per week for 12 weeks during the certification period from 5/20/2024 to 8/16/24 to achieve the established goals.      Treatment to include: Paraffin, Fluidotherapy, Manual therapy/joint mobilizations, Modalities for pain management, US 3 mhz, Therapeutic exercises/activities., Iontophoresis with 2.0 cc Dexamethasone, Strengthening, Orthotic Fabrication/Fit/Training, Edema Control, Scar Management, Wound Care, Electrical Modalities, Joint Protection, and Energy Conservation, as well as any other treatments deemed necessary based on the patient's needs or progress.      Updates/Grading for next session: Continue with current plan of care       Olinda Cutler, OT

## 2024-06-05 ENCOUNTER — CLINICAL SUPPORT (OUTPATIENT)
Dept: REHABILITATION | Facility: HOSPITAL | Age: 74
End: 2024-06-05
Payer: MEDICARE

## 2024-06-05 DIAGNOSIS — R60.0 LOCALIZED EDEMA: ICD-10-CM

## 2024-06-05 DIAGNOSIS — M25.60 STIFFNESS IN JOINT: Primary | ICD-10-CM

## 2024-06-05 DIAGNOSIS — R53.1 WEAKNESS: ICD-10-CM

## 2024-06-05 DIAGNOSIS — M79.644 PAIN IN FINGER OF RIGHT HAND: ICD-10-CM

## 2024-06-05 PROCEDURE — 97530 THERAPEUTIC ACTIVITIES: CPT | Mod: PO

## 2024-06-05 PROCEDURE — 97140 MANUAL THERAPY 1/> REGIONS: CPT | Mod: PO

## 2024-06-05 NOTE — PROGRESS NOTES
Occupational Therapy Daily Treatment Note     Name: Kishan Dow  Clinic Number: [id number]    Therapy Diagnosis:   Encounter Diagnoses   Name Primary?    Stiffness in joint Yes    Weakness     Pain in finger of right hand     Localized edema      Physician: Sandra Jj NP    Visit Date: 6/5/2024    Physician Orders: OT eval/treat   Medical Diagnosis:   Right index finger irrigation and debridement involving skin, subcutaneous tissue, neurovascular structures, tendon sheath:  4 x 1 cm   Evaluation Date: 5/20/2024  Plan of Care Certification Date: 8/16/24  Authorization Period: 5/10/24 - 5/10/25  Surgery Date and Procedure:  5/3/24, Right index finger irrigation and debridement involving skin, subcutaneous tissue, neurovascular structures, tendon sheath:  4 x 1 cm   Date of Return to MD: 6/14/24  Onset Date: 4/25/24  Visit # / Visits authorized: 3 / 20  FOTO Completion: 1/3    Time In:1100  Time Out: 1200  Total Billable Time: 60 minutes    Precautions:  Standard      Subjective     Pt reports: He states he is completing his exercises at home  he was compliant with home exercise program given last session.   Response to previous treatment: increased ROM  Functional change: increased ROM    Pain: 2/10  Location: right hands      Objective          Mental status: alert, oriented x3     Observation:   Pt incision is closed with no signs of infection. His index and long finger have moderate swelling         Sensation: Pt denies any numbness or tingling  Edema: Circumferential measurements: In centimters       5/20/2024 5/20/2024     Left Right   Index:         P1 6.3 7.8   P2 5.6 7   P3 5 5.5      Left Hand Finger ROM INDEX   5/20/2024 LONG   5/20/2024   MP  65 85   PIP  105 110   DIP  75 80      Right Hand Finger ROM INDEX   5/20/2024 LONG   5/20/2024   MP  50 70   PIP  60 90   DIP  20 60       Strength: (SANAZ Dynamometer in psi.)        5/20/2024 5/20/2024     Left Right   Rung II 65 35         Pinch  Strength (Measured in psi)       5/20/2024 5/20/2024     Left Right   Key Pinch 10  7   3pt Pinch 10 3   2pt Pinch 7 2         CMS Impairment/Limitation/Restriction for FOTO Hand/Wrist/Finger Survey     Therapist reviewed FOTO scores for Kishan Dow on 5/20/2024.   FOTO documents entered into [employer] - see Media section.     Intake Score: 41%  Category: Self Care             Treatment     Kishan received the following supervised modalities after being cleared for contraindications for 15 minutes:   -paraffin wax and moist hot pack to increase extensibility     Kishan participated in dynamic functional therapeutic activities to improve functional performance for 45  minutes, including:  - PROM joint blocking to index finger MP, PIP, and DIP joints  - AROM joint blocking to index finger MP, PIP, and DIP joints   - TGE's x 20  - composite fist x 20  - finger lifts x 20  - finger spreads x 20  - isospheres x 3 minutes   - pink putty dig outs   - pom pom  with yellow clothes pin   - pom pom  with opposition between fingers  - pink putty  x 3  - green foam squeezes x 3 min  Home Exercises and Education Provided     Education provided:   - Progress towards goals     Written Home Exercises Provided: Patient instructed to cont prior HEP.  Exercises were reviewed and Kishan was able to demonstrate them prior to the end of the session.  Kishan demonstrated good  understanding of the HEP provided.   .   See EMR under Patient Instructions for exercises provided prior visit.        Assessment     Pt would continue to benefit from skilled OT. Pt tolerated added therapeutic activities well without additional complaints of pain. Pt is motivated. Will progress as tolerated.      Kishan is progressing well towards his goals and there are no updates to goals at this time. Pt prognosis is Good.     Pt will continue to benefit from skilled outpatient occupational therapy to address the deficits listed in the problem list  on initial evaluation provide pt/family education and to maximize pt's level of independence in the home and community environment.     Anticipated barriers to occupational therapy: none at this time    Pt's spiritual, cultural and educational needs considered and pt agreeable to plan of care and goals.    Goals:    LTG's (12 weeks):  1)   Increase ROM in MP joint of index finger to 65 degrees to increase functional hand use for ADLs and IADLs Ongoing  2)   Increase ROM in PIP joint of index finger to 100 degrees to increase functional hand use for ADLs and IADLsOngoing  3)   Increase ROM in DIP joint of index finger to 60 degrees to increase functional hand use for ADLs and IADLsOngoing  4)   Increase  strength to 60 lbs. to grasp for ADLs and iADlsOngoing  5)   Increase key pinch to 10 psis to increase independence with button and FM Coordination. Ongoing  6)   Decrease complaints of pain to  3 out of 10 at worst to increase functional hand use for ADL/work/leisure activities.Ongoing  7)   Patient to score at 77% or more on FOTO to demonstrate improved perception of functional hand use.Ongoing  8)   Pt will return to near to prior level of function for ADLs and household management reporting I or Mod I with ADLs (dressing, feeding, grooming, toileting). Ongoing     STG's (6 weeks)  1)   Patient to be IND with HEP and modalities for pain/edema managment.Ongoing  2)   Increase ROM in MP joint of index finger to 60 degrees to increase functional hand use for ADLs and IADLsOngoing  3)   Increase ROM in PIP joint of index finger to 80 degrees to increase functional hand use for ADLs and IADLsOngoing  4)   Increase ROM in DIP joint of index finger to 40 degrees to increase functional hand use for ADLs and IADLsOngoing  5)   Increase  strength to 45 lbs. to grasp for ADLs and iADlsOngoing  6)   Increase key pinch to 8 psis to increase independence with button and FM Coordination. Ongoing  7)   Decrease  complaints of pain to  3 out of 10 at worst to increase functional hand use for ADL/work/leisure activities.Ongoing  8)   Patient to be IND wiht Orthotic use, wear and care precautions. Ongoing  9)   Decrease complaints of pain to  1 out of 10 at worst to increase functional hand use for ADL/work/leisure activities.Ongoing              Plan      Pt to be treated by Occupational Therapy 2 times per week for 12 weeks during the certification period from 5/20/2024 to 8/16/24 to achieve the established goals.      Treatment to include: Paraffin, Fluidotherapy, Manual therapy/joint mobilizations, Modalities for pain management, US 3 mhz, Therapeutic exercises/activities., Iontophoresis with 2.0 cc Dexamethasone, Strengthening, Orthotic Fabrication/Fit/Training, Edema Control, Scar Management, Wound Care, Electrical Modalities, Joint Protection, and Energy Conservation, as well as any other treatments deemed necessary based on the patient's needs or progress.      Updates/Grading for next session: Continue with current plan of care       Olinda Cutler OT

## 2024-06-10 ENCOUNTER — TELEPHONE (OUTPATIENT)
Dept: ORTHOPEDICS | Facility: CLINIC | Age: 74
End: 2024-06-10
Payer: MEDICARE

## 2025-01-02 ENCOUNTER — HOSPITAL ENCOUNTER (INPATIENT)
Facility: HOSPITAL | Age: 75
LOS: 5 days | Discharge: HOME | DRG: 178 | End: 2025-01-07
Attending: EMERGENCY MEDICINE | Admitting: STUDENT IN AN ORGANIZED HEALTH CARE EDUCATION/TRAINING PROGRAM
Payer: MEDICARE

## 2025-01-02 DIAGNOSIS — R79.89 ELEVATED TROPONIN: ICD-10-CM

## 2025-01-02 DIAGNOSIS — R79.89 TROPONIN LEVEL ELEVATED: ICD-10-CM

## 2025-01-02 DIAGNOSIS — R09.02 HYPOXIA: ICD-10-CM

## 2025-01-02 DIAGNOSIS — I50.9 NEW ONSET OF CONGESTIVE HEART FAILURE: ICD-10-CM

## 2025-01-02 DIAGNOSIS — R06.02 SHORTNESS OF BREATH: ICD-10-CM

## 2025-01-02 DIAGNOSIS — R05.9 COUGH: ICD-10-CM

## 2025-01-02 DIAGNOSIS — I50.9 CHF (CONGESTIVE HEART FAILURE): ICD-10-CM

## 2025-01-02 DIAGNOSIS — R07.9 CHEST PAIN: ICD-10-CM

## 2025-01-02 DIAGNOSIS — U07.1 COVID-19: Primary | ICD-10-CM

## 2025-01-02 PROBLEM — E11.9 DIABETES MELLITUS: Status: ACTIVE | Noted: 2025-01-02

## 2025-01-02 PROBLEM — I25.10 ARTERIOSCLEROSIS OF CORONARY ARTERY: Status: ACTIVE | Noted: 2025-01-02

## 2025-01-02 PROBLEM — I21.9 MYOCARDIAL INFARCTION: Status: ACTIVE | Noted: 2025-01-02

## 2025-01-02 PROBLEM — I10 HYPERTENSION: Status: ACTIVE | Noted: 2025-01-02

## 2025-01-02 PROBLEM — E78.5 HYPERLIPIDEMIA: Status: ACTIVE | Noted: 2025-01-02

## 2025-01-02 LAB
ALBUMIN SERPL BCP-MCNC: 4 G/DL (ref 3.5–5.2)
ALP SERPL-CCNC: 94 U/L (ref 40–150)
ALT SERPL W/O P-5'-P-CCNC: 9 U/L (ref 10–44)
ANION GAP SERPL CALC-SCNC: 11 MMOL/L (ref 8–16)
APTT PPP: 28.4 SEC (ref 21–32)
AST SERPL-CCNC: 19 U/L (ref 10–40)
BASOPHILS # BLD AUTO: 0.02 K/UL (ref 0–0.2)
BASOPHILS NFR BLD: 0.3 % (ref 0–1.9)
BILIRUB SERPL-MCNC: 1.4 MG/DL (ref 0.1–1)
BNP SERPL-MCNC: 1068 PG/ML (ref 0–99)
BUN SERPL-MCNC: 21 MG/DL (ref 8–23)
CALCIUM SERPL-MCNC: 9.7 MG/DL (ref 8.7–10.5)
CHLORIDE SERPL-SCNC: 97 MMOL/L (ref 95–110)
CK SERPL-CCNC: 46 U/L (ref 20–200)
CO2 SERPL-SCNC: 26 MMOL/L (ref 23–29)
CREAT SERPL-MCNC: 1.3 MG/DL (ref 0.5–1.4)
DIFFERENTIAL METHOD BLD: ABNORMAL
EOSINOPHIL # BLD AUTO: 0 K/UL (ref 0–0.5)
EOSINOPHIL NFR BLD: 0.1 % (ref 0–8)
ERYTHROCYTE [DISTWIDTH] IN BLOOD BY AUTOMATED COUNT: 13 % (ref 11.5–14.5)
ERYTHROCYTE [SEDIMENTATION RATE] IN BLOOD BY PHOTOMETRIC METHOD: 24 MM/HR (ref 0–23)
EST. GFR  (NO RACE VARIABLE): 57.3 ML/MIN/1.73 M^2
ESTIMATED AVG GLUCOSE: 151 MG/DL (ref 68–131)
GLUCOSE SERPL-MCNC: 171 MG/DL (ref 70–110)
HBA1C MFR BLD: 6.9 % (ref 4–5.6)
HCT VFR BLD AUTO: 44.3 % (ref 40–54)
HGB BLD-MCNC: 15 G/DL (ref 14–18)
IMM GRANULOCYTES # BLD AUTO: 0.05 K/UL (ref 0–0.04)
IMM GRANULOCYTES NFR BLD AUTO: 0.7 % (ref 0–0.5)
INFLUENZA A, MOLECULAR: NOT DETECTED
INFLUENZA B, MOLECULAR: NOT DETECTED
INR PPP: 1.1 (ref 0.8–1.2)
LACTATE SERPL-SCNC: 1.6 MMOL/L (ref 0.5–2.2)
LYMPHOCYTES # BLD AUTO: 1 K/UL (ref 1–4.8)
LYMPHOCYTES NFR BLD: 13.2 % (ref 18–48)
MCH RBC QN AUTO: 31 PG (ref 27–31)
MCHC RBC AUTO-ENTMCNC: 33.9 G/DL (ref 32–36)
MCV RBC AUTO: 92 FL (ref 82–98)
MONOCYTES # BLD AUTO: 0.9 K/UL (ref 0.3–1)
MONOCYTES NFR BLD: 11.6 % (ref 4–15)
NEUTROPHILS # BLD AUTO: 5.5 K/UL (ref 1.8–7.7)
NEUTROPHILS NFR BLD: 74.1 % (ref 38–73)
NRBC BLD-RTO: 0 /100 WBC
OHS QRS DURATION: 114 MS
OHS QTC CALCULATION: 474 MS
PLATELET # BLD AUTO: 248 K/UL (ref 150–450)
PMV BLD AUTO: 8.9 FL (ref 9.2–12.9)
POCT GLUCOSE: 175 MG/DL (ref 70–110)
POTASSIUM SERPL-SCNC: 4.2 MMOL/L (ref 3.5–5.1)
PROT SERPL-MCNC: 7.6 G/DL (ref 6–8.4)
PROTHROMBIN TIME: 11.9 SEC (ref 9–12.5)
RBC # BLD AUTO: 4.84 M/UL (ref 4.6–6.2)
RSV AG BY MOLECULAR METHOD: NOT DETECTED
SARS-COV-2 RNA RESP QL NAA+PROBE: DETECTED
SODIUM SERPL-SCNC: 134 MMOL/L (ref 136–145)
TROPONIN I SERPL DL<=0.01 NG/ML-MCNC: 62 NG/L (ref 0–35)
TROPONIN I SERPL DL<=0.01 NG/ML-MCNC: 71 NG/L (ref 0–35)
WBC # BLD AUTO: 7.41 K/UL (ref 3.9–12.7)

## 2025-01-02 PROCEDURE — 83036 HEMOGLOBIN GLYCOSYLATED A1C: CPT | Performed by: EMERGENCY MEDICINE

## 2025-01-02 PROCEDURE — 99285 EMERGENCY DEPT VISIT HI MDM: CPT | Mod: 25

## 2025-01-02 PROCEDURE — 85610 PROTHROMBIN TIME: CPT | Performed by: PHYSICIAN ASSISTANT

## 2025-01-02 PROCEDURE — 63600175 PHARM REV CODE 636 W HCPCS: Performed by: EMERGENCY MEDICINE

## 2025-01-02 PROCEDURE — 12000002 HC ACUTE/MED SURGE SEMI-PRIVATE ROOM

## 2025-01-02 PROCEDURE — 83880 ASSAY OF NATRIURETIC PEPTIDE: CPT | Performed by: EMERGENCY MEDICINE

## 2025-01-02 PROCEDURE — 94761 N-INVAS EAR/PLS OXIMETRY MLT: CPT

## 2025-01-02 PROCEDURE — 83605 ASSAY OF LACTIC ACID: CPT | Performed by: PHYSICIAN ASSISTANT

## 2025-01-02 PROCEDURE — 84484 ASSAY OF TROPONIN QUANT: CPT | Performed by: EMERGENCY MEDICINE

## 2025-01-02 PROCEDURE — 96365 THER/PROPH/DIAG IV INF INIT: CPT

## 2025-01-02 PROCEDURE — XW033E5 INTRODUCTION OF REMDESIVIR ANTI-INFECTIVE INTO PERIPHERAL VEIN, PERCUTANEOUS APPROACH, NEW TECHNOLOGY GROUP 5: ICD-10-PCS | Performed by: EMERGENCY MEDICINE

## 2025-01-02 PROCEDURE — 25000242 PHARM REV CODE 250 ALT 637 W/ HCPCS: Performed by: EMERGENCY MEDICINE

## 2025-01-02 PROCEDURE — 25000003 PHARM REV CODE 250: Performed by: PHYSICIAN ASSISTANT

## 2025-01-02 PROCEDURE — 84484 ASSAY OF TROPONIN QUANT: CPT | Mod: 91 | Performed by: PHYSICIAN ASSISTANT

## 2025-01-02 PROCEDURE — 93010 ELECTROCARDIOGRAM REPORT: CPT | Mod: ,,, | Performed by: INTERNAL MEDICINE

## 2025-01-02 PROCEDURE — 85652 RBC SED RATE AUTOMATED: CPT | Performed by: PHYSICIAN ASSISTANT

## 2025-01-02 PROCEDURE — 82550 ASSAY OF CK (CPK): CPT | Performed by: PHYSICIAN ASSISTANT

## 2025-01-02 PROCEDURE — 96375 TX/PRO/DX INJ NEW DRUG ADDON: CPT

## 2025-01-02 PROCEDURE — 94640 AIRWAY INHALATION TREATMENT: CPT

## 2025-01-02 PROCEDURE — 25000003 PHARM REV CODE 250: Performed by: EMERGENCY MEDICINE

## 2025-01-02 PROCEDURE — 27000207 HC ISOLATION

## 2025-01-02 PROCEDURE — 85730 THROMBOPLASTIN TIME PARTIAL: CPT | Performed by: PHYSICIAN ASSISTANT

## 2025-01-02 PROCEDURE — 85025 COMPLETE CBC W/AUTO DIFF WBC: CPT | Performed by: EMERGENCY MEDICINE

## 2025-01-02 PROCEDURE — 82962 GLUCOSE BLOOD TEST: CPT

## 2025-01-02 PROCEDURE — 0241U SARS-COV2 (COVID) WITH FLU/RSV BY PCR: CPT | Performed by: EMERGENCY MEDICINE

## 2025-01-02 PROCEDURE — 93005 ELECTROCARDIOGRAM TRACING: CPT

## 2025-01-02 PROCEDURE — 80053 COMPREHEN METABOLIC PANEL: CPT | Performed by: EMERGENCY MEDICINE

## 2025-01-02 PROCEDURE — 63600175 PHARM REV CODE 636 W HCPCS: Performed by: PHYSICIAN ASSISTANT

## 2025-01-02 RX ORDER — INSULIN ASPART 100 [IU]/ML
0-5 INJECTION, SOLUTION INTRAVENOUS; SUBCUTANEOUS
Status: DISCONTINUED | OUTPATIENT
Start: 2025-01-02 | End: 2025-01-07 | Stop reason: HOSPADM

## 2025-01-02 RX ORDER — OXYCODONE HYDROCHLORIDE 5 MG/1
5 TABLET ORAL EVERY 6 HOURS PRN
Status: DISCONTINUED | OUTPATIENT
Start: 2025-01-02 | End: 2025-01-07 | Stop reason: HOSPADM

## 2025-01-02 RX ORDER — IBUPROFEN 200 MG
16 TABLET ORAL
Status: DISCONTINUED | OUTPATIENT
Start: 2025-01-02 | End: 2025-01-07 | Stop reason: HOSPADM

## 2025-01-02 RX ORDER — GABAPENTIN 300 MG/1
300 CAPSULE ORAL 3 TIMES DAILY
Status: DISCONTINUED | OUTPATIENT
Start: 2025-01-02 | End: 2025-01-03

## 2025-01-02 RX ORDER — FUROSEMIDE 10 MG/ML
20 INJECTION INTRAMUSCULAR; INTRAVENOUS DAILY
Status: DISCONTINUED | OUTPATIENT
Start: 2025-01-03 | End: 2025-01-03

## 2025-01-02 RX ORDER — ENOXAPARIN SODIUM 100 MG/ML
40 INJECTION SUBCUTANEOUS EVERY 24 HOURS
Status: DISCONTINUED | OUTPATIENT
Start: 2025-01-02 | End: 2025-01-02

## 2025-01-02 RX ORDER — PANTOPRAZOLE SODIUM 40 MG/1
40 TABLET, DELAYED RELEASE ORAL DAILY
Status: DISCONTINUED | OUTPATIENT
Start: 2025-01-02 | End: 2025-01-07 | Stop reason: HOSPADM

## 2025-01-02 RX ORDER — IPRATROPIUM BROMIDE AND ALBUTEROL SULFATE 2.5; .5 MG/3ML; MG/3ML
3 SOLUTION RESPIRATORY (INHALATION)
Status: COMPLETED | OUTPATIENT
Start: 2025-01-02 | End: 2025-01-02

## 2025-01-02 RX ORDER — ASPIRIN 81 MG/1
81 TABLET ORAL DAILY
Status: DISCONTINUED | OUTPATIENT
Start: 2025-01-03 | End: 2025-01-07 | Stop reason: HOSPADM

## 2025-01-02 RX ORDER — PROCHLORPERAZINE EDISYLATE 5 MG/ML
5 INJECTION INTRAMUSCULAR; INTRAVENOUS EVERY 6 HOURS PRN
Status: DISCONTINUED | OUTPATIENT
Start: 2025-01-02 | End: 2025-01-07 | Stop reason: HOSPADM

## 2025-01-02 RX ORDER — IBUPROFEN 200 MG
24 TABLET ORAL
Status: DISCONTINUED | OUTPATIENT
Start: 2025-01-02 | End: 2025-01-07 | Stop reason: HOSPADM

## 2025-01-02 RX ORDER — CLOPIDOGREL BISULFATE 75 MG/1
75 TABLET ORAL DAILY
Status: DISCONTINUED | OUTPATIENT
Start: 2025-01-03 | End: 2025-01-07 | Stop reason: HOSPADM

## 2025-01-02 RX ORDER — ATORVASTATIN CALCIUM 80 MG/1
80 TABLET, FILM COATED ORAL NIGHTLY
COMMUNITY
Start: 2024-11-02

## 2025-01-02 RX ORDER — ACETAMINOPHEN 325 MG/1
650 TABLET ORAL
Status: COMPLETED | OUTPATIENT
Start: 2025-01-02 | End: 2025-01-02

## 2025-01-02 RX ORDER — POLYETHYLENE GLYCOL 3350 17 G/17G
17 POWDER, FOR SOLUTION ORAL 2 TIMES DAILY PRN
Status: DISCONTINUED | OUTPATIENT
Start: 2025-01-02 | End: 2025-01-07 | Stop reason: HOSPADM

## 2025-01-02 RX ORDER — KETOROLAC TROMETHAMINE 30 MG/ML
15 INJECTION, SOLUTION INTRAMUSCULAR; INTRAVENOUS EVERY 6 HOURS PRN
Status: DISCONTINUED | OUTPATIENT
Start: 2025-01-02 | End: 2025-01-03

## 2025-01-02 RX ORDER — SODIUM CHLORIDE 0.9 % (FLUSH) 0.9 %
10 SYRINGE (ML) INJECTION
Status: DISCONTINUED | OUTPATIENT
Start: 2025-01-02 | End: 2025-01-07 | Stop reason: HOSPADM

## 2025-01-02 RX ORDER — ASCORBIC ACID 500 MG
500 TABLET ORAL 2 TIMES DAILY
Status: DISCONTINUED | OUTPATIENT
Start: 2025-01-02 | End: 2025-01-07 | Stop reason: HOSPADM

## 2025-01-02 RX ORDER — ATORVASTATIN CALCIUM 40 MG/1
80 TABLET, FILM COATED ORAL NIGHTLY
Status: DISCONTINUED | OUTPATIENT
Start: 2025-01-02 | End: 2025-01-07 | Stop reason: HOSPADM

## 2025-01-02 RX ORDER — ONDANSETRON HYDROCHLORIDE 2 MG/ML
4 INJECTION, SOLUTION INTRAVENOUS EVERY 12 HOURS PRN
Status: DISCONTINUED | OUTPATIENT
Start: 2025-01-02 | End: 2025-01-07 | Stop reason: HOSPADM

## 2025-01-02 RX ORDER — GLYBURIDE 5 MG/1
5 TABLET ORAL
COMMUNITY
Start: 2024-10-31

## 2025-01-02 RX ORDER — AMOXICILLIN 250 MG
1 CAPSULE ORAL 2 TIMES DAILY PRN
Status: DISCONTINUED | OUTPATIENT
Start: 2025-01-02 | End: 2025-01-07 | Stop reason: HOSPADM

## 2025-01-02 RX ORDER — ENOXAPARIN SODIUM 100 MG/ML
1 INJECTION SUBCUTANEOUS 2 TIMES DAILY
Status: DISCONTINUED | OUTPATIENT
Start: 2025-01-02 | End: 2025-01-03

## 2025-01-02 RX ORDER — THIAMINE HCL 100 MG
100 TABLET ORAL DAILY
Status: DISCONTINUED | OUTPATIENT
Start: 2025-01-03 | End: 2025-01-07 | Stop reason: HOSPADM

## 2025-01-02 RX ORDER — INSULIN GLARGINE 100 [IU]/ML
5 INJECTION, SOLUTION SUBCUTANEOUS DAILY
Status: DISCONTINUED | OUTPATIENT
Start: 2025-01-03 | End: 2025-01-07 | Stop reason: HOSPADM

## 2025-01-02 RX ORDER — FUROSEMIDE 10 MG/ML
40 INJECTION INTRAMUSCULAR; INTRAVENOUS
Status: COMPLETED | OUTPATIENT
Start: 2025-01-02 | End: 2025-01-02

## 2025-01-02 RX ORDER — DEXAMETHASONE SODIUM PHOSPHATE 4 MG/ML
6 INJECTION, SOLUTION INTRA-ARTICULAR; INTRALESIONAL; INTRAMUSCULAR; INTRAVENOUS; SOFT TISSUE
Status: COMPLETED | OUTPATIENT
Start: 2025-01-02 | End: 2025-01-02

## 2025-01-02 RX ORDER — GLUCAGON 1 MG
1 KIT INJECTION
Status: DISCONTINUED | OUTPATIENT
Start: 2025-01-02 | End: 2025-01-07 | Stop reason: HOSPADM

## 2025-01-02 RX ORDER — ACETAMINOPHEN 325 MG/1
650 TABLET ORAL EVERY 8 HOURS PRN
Status: DISCONTINUED | OUTPATIENT
Start: 2025-01-02 | End: 2025-01-07 | Stop reason: HOSPADM

## 2025-01-02 RX ORDER — CILOSTAZOL 50 MG/1
50 TABLET ORAL 2 TIMES DAILY
Status: DISCONTINUED | OUTPATIENT
Start: 2025-01-02 | End: 2025-01-02

## 2025-01-02 RX ORDER — FUROSEMIDE 10 MG/ML
20 INJECTION INTRAMUSCULAR; INTRAVENOUS 2 TIMES DAILY
Status: DISCONTINUED | OUTPATIENT
Start: 2025-01-03 | End: 2025-01-02

## 2025-01-02 RX ADMIN — ENOXAPARIN SODIUM 50 MG: 60 INJECTION SUBCUTANEOUS at 08:01

## 2025-01-02 RX ADMIN — Medication 500 MG: at 08:01

## 2025-01-02 RX ADMIN — REMDESIVIR 200 MG: 100 INJECTION, POWDER, LYOPHILIZED, FOR SOLUTION INTRAVENOUS at 05:01

## 2025-01-02 RX ADMIN — PANTOPRAZOLE SODIUM 40 MG: 40 TABLET, DELAYED RELEASE ORAL at 03:01

## 2025-01-02 RX ADMIN — IPRATROPIUM BROMIDE AND ALBUTEROL SULFATE 3 ML: 2.5; .5 SOLUTION RESPIRATORY (INHALATION) at 02:01

## 2025-01-02 RX ADMIN — GABAPENTIN 300 MG: 300 CAPSULE ORAL at 08:01

## 2025-01-02 RX ADMIN — DEXAMETHASONE SODIUM PHOSPHATE 6 MG: 4 INJECTION INTRA-ARTICULAR; INTRALESIONAL; INTRAMUSCULAR; INTRAVENOUS; SOFT TISSUE at 03:01

## 2025-01-02 RX ADMIN — ACETAMINOPHEN 650 MG: 325 TABLET ORAL at 02:01

## 2025-01-02 RX ADMIN — FUROSEMIDE 40 MG: 10 INJECTION, SOLUTION INTRAVENOUS at 03:01

## 2025-01-02 RX ADMIN — ATORVASTATIN CALCIUM 80 MG: 40 TABLET, FILM COATED ORAL at 08:01

## 2025-01-02 NOTE — ASSESSMENT & PLAN NOTE
"Patient's FSGs are uncontrolled due to hyperglycemia on current medication regimen.  Last A1c reviewed- No results found for: "LABA1C", "HGBA1C"  Most recent fingerstick glucose reviewed- No results for input(s): "POCTGLUCOSE" in the last 24 hours.  Current correctional scale  Low  Maintain anti-hyperglycemic dose as follows-   Antihyperglycemics (From admission, onward)      Start     Stop Route Frequency Ordered    01/03/25 0900  insulin glargine U-100 (Lantus) pen 5 Units         -- SubQ Daily 01/02/25 1538    01/02/25 1637  insulin aspart U-100 pen 0-5 Units         -- SubQ Before meals & nightly PRN 01/02/25 1538          Hold Oral hypoglycemics while patient is in the hospital.  "

## 2025-01-02 NOTE — ED NOTES
Assumed care for pt after recieving report from RAQUEL Felix. Pt. resting in bed in NAD, RR e/u. Vital signs stable and within desired limits at this time of assessment. Pt. offered bathroom assistance and denies need at this time. Explanation of care/wait provided. Pt verbalizes no needs at this time. Bed in low, locked position with rails up and call bell in reach. Pt's white board updated with today's care team and plan. Shima at bedside.

## 2025-01-02 NOTE — SUBJECTIVE & OBJECTIVE
Past Medical History:   Diagnosis Date    Anticoagulant long-term use     Coronary artery disease     Diabetes mellitus     High cholesterol     Hypertension        Past Surgical History:   Procedure Laterality Date    CORONARY ANGIOPLASTY WITH STENT PLACEMENT      EXPLORATION, WOUND, UPPER EXTREMITY Right 5/3/2024    Procedure: EXPLORATION, WOUND, UPPER EXTREMITY;  Surgeon: Mayi Smith MD;  Location: [facility] OR;  Service: Orthopedics;  Laterality: Right;    EXTERNAL EAR SURGERY      Stents in legs      WOUND DEBRIDEMENT Right 5/3/2024    Procedure: DEBRIDEMENT, WOUND;  Surgeon: Mayi Smith MD;  Location: [facility] OR;  Service: Orthopedics;  Laterality: Right;       Review of patient's allergies indicates:  No Known Allergies    Current Facility-Administered Medications on File Prior to Encounter   Medication    ceFAZolin 2 g in dextrose 5 % in water (D5W) 50 mL IVPB (MB+)     Current Outpatient Medications on File Prior to Encounter   Medication Sig    atorvastatin (LIPITOR) 80 MG tablet Take 80 mg by mouth every evening.    glyBURIDE (DIABETA) 5 MG tablet Take 5 mg by mouth daily with breakfast.    aspirin (ECOTRIN) 81 MG EC tablet Take 81 mg by mouth once daily.    clopidogrel (PLAVIX) 75 mg tablet Take 75 mg by mouth once daily.    gabapentin (NEURONTIN) 300 MG capsule Take 300 mg by mouth 3 (three) times daily.    linagliptin (TRADJENTA) 5 mg Tab tablet Take 5 mg by mouth once daily.    loratadine (CLARITIN) 10 mg tablet Take 1 tablet (10 mg total) by mouth once daily.    losartan-hydrochlorothiazide 100-12.5 mg (HYZAAR) 100-12.5 mg Tab Take 1 tablet by mouth once daily.    metformin (GLUCOPHAGE) 1000 MG tablet Take 1,000 mg by mouth 2 (two) times daily with meals.    metoprolol succinate (TOPROL-XL) 100 MG 24 hr tablet Take 1 tablet (100 mg total) by mouth once daily.    nitroGLYCERIN (NITROSTAT) 0.3 MG SL tablet Place 0.3 mg under the tongue every 5 (five) minutes as needed for Chest pain.     omeprazole (PRILOSEC) 40 MG capsule Take 40 mg by mouth once daily.    pioglitazone (ACTOS) 45 MG tablet Take 45 mg by mouth once daily.    promethazine (PHENERGAN) 25 MG tablet Take 1 tablet (25 mg total) by mouth every 6 (six) hours as needed for Nausea.    thiamine (VITAMIN B-1) 100 MG tablet Take 100 mg by mouth once daily.    traMADoL (ULTRAM) 50 mg tablet Take 1 tablet (50 mg total) by mouth every 6 (six) hours as needed for Pain.    [DISCONTINUED] cilostazol (PLETAL) 100 MG Tab Take 50 mg by mouth 2 (two) times daily.    [DISCONTINUED] cilostazol (PLETAL) 50 MG Tab Take 50 mg by mouth 2 (two) times daily.     Family History    None       Tobacco Use    Smoking status: Every Day     Current packs/day: 0.50     Average packs/day: 0.5 packs/day for 59.0 years (29.5 ttl pk-yrs)     Types: Cigarettes     Start date: 1/1/1966    Smokeless tobacco: Never   Substance and Sexual Activity    Alcohol use: No    Drug use: No    Sexual activity: Not on file     Review of Systems   Constitutional:  Positive for fatigue. Negative for activity change, chills and fever.   HENT:  Negative for trouble swallowing.    Eyes:  Negative for photophobia and visual disturbance.   Respiratory:  Positive for cough and shortness of breath. Negative for chest tightness and wheezing.    Cardiovascular:  Negative for chest pain, palpitations and leg swelling.   Gastrointestinal:  Negative for abdominal pain, constipation, diarrhea, nausea and vomiting.   Genitourinary:  Negative for dysuria, frequency, hematuria and urgency.   Musculoskeletal:  Negative for arthralgias, back pain and gait problem.   Skin:  Negative for color change and rash.   Neurological:  Negative for dizziness, syncope, weakness, light-headedness, numbness and headaches.   Psychiatric/Behavioral:  Negative for agitation and confusion. The patient is not nervous/anxious.      Objective:     Vital Signs (Most Recent):  Temp: 100.1 °F (37.8 °C) (01/02/25 1553)  Pulse: 71  (01/02/25 1553)  Resp: 16 (01/02/25 1553)  BP: (!) 105/51 (01/02/25 1553)  SpO2: 95 % (01/02/25 1553) Vital Signs (24h Range):  Temp:  [98.4 °F (36.9 °C)-100.1 °F (37.8 °C)] 100.1 °F (37.8 °C)  Pulse:  [69-72] 71  Resp:  [16-18] 16  SpO2:  [92 %-96 %] 95 %  BP: (105-156)/(51-70) 105/51     Weight: 50.8 kg (111 lb 15.9 oz)  Body mass index is 18.08 kg/m².     Physical Exam  Vitals and nursing note reviewed.   Constitutional:       General: He is not in acute distress.     Appearance: Normal appearance. He is not ill-appearing.   HENT:      Head: Normocephalic and atraumatic.      Right Ear: External ear normal.      Left Ear: External ear normal.   Eyes:      Extraocular Movements: Extraocular movements intact.      Conjunctiva/sclera: Conjunctivae normal.      Pupils: Pupils are equal, round, and reactive to light.   Cardiovascular:      Rate and Rhythm: Normal rate and regular rhythm.      Pulses: Normal pulses.      Heart sounds: Normal heart sounds. No murmur heard.  Pulmonary:      Effort: Pulmonary effort is normal. No respiratory distress.      Breath sounds: Normal breath sounds. No rales.      Comments: On 2L NC  Abdominal:      General: Abdomen is flat. Bowel sounds are normal. There is no distension.      Tenderness: There is no abdominal tenderness.   Musculoskeletal:         General: Normal range of motion.      Cervical back: Normal range of motion and neck supple.      Right lower leg: No edema.      Left lower leg: No edema.   Skin:     General: Skin is warm and dry.      Capillary Refill: Capillary refill takes less than 2 seconds.      Coloration: Skin is not jaundiced.   Neurological:      General: No focal deficit present.      Mental Status: He is alert and oriented to person, place, and time. Mental status is at baseline.      Cranial Nerves: No cranial nerve deficit.   Psychiatric:         Mood and Affect: Mood normal.         Behavior: Behavior normal.              CRANIAL NERVES     CN III,  IV, VI   Pupils are equal, round, and reactive to light.       Significant Labs: All pertinent labs within the past 24 hours have been reviewed.  BMP:   Recent Labs   Lab 01/02/25  1415   *   *   K 4.2   CL 97   CO2 26   BUN 21   CREATININE 1.3   CALCIUM 9.7     CBC:   Recent Labs   Lab 01/02/25  1415   WBC 7.41   HGB 15.0   HCT 44.3        CMP:   Recent Labs   Lab 01/02/25  1415   *   K 4.2   CL 97   CO2 26   *   BUN 21   CREATININE 1.3   CALCIUM 9.7   PROT 7.6   ALBUMIN 4.0   BILITOT 1.4*   ALKPHOS 94   AST 19   ALT 9*   ANIONGAP 11     Cardiac Markers:   Recent Labs   Lab 01/02/25  1415   BNP 1,068*     Troponin:   Recent Labs   Lab 01/02/25  1415   TROPONINIHS 62*       Significant Imaging: I have reviewed all pertinent imaging results/findings within the past 24 hours.    Imaging Results              X-Ray Chest PA And Lateral (Final result)  Result time 01/02/25 14:28:54      Final result by Vlad Arellano MD (01/02/25 14:28:54)                   Impression:      No acute chest disease identified.      Electronically signed by: Vlad Arellnao MD  Date:    01/02/2025  Time:    14:28               Narrative:    EXAMINATION:  XR CHEST PA AND LATERAL    CLINICAL HISTORY:  Cough, unspecified    TECHNIQUE:  PA and lateral views of the chest were performed.    COMPARISON:  10/04/2021.    FINDINGS:  The heart is not enlarged.  There is atherosclerotic calcification present within the thoracic aorta.  Pulmonary vasculature is within normal limits.  The lungs are free of focal consolidations.  There is no evidence for pneumothorax or large pleural effusions.  Bony structures are grossly intact.

## 2025-01-02 NOTE — ED TRIAGE NOTES
Kishan Dow, a 74 y.o. male presents to the ED w/ complaint of back pain that started a few days ago.     Triage note:  Chief Complaint   Patient presents with    Back Pain     Back pain starting a few days ago, also endorsing ear fullness and sore throat     Review of patient's allergies indicates:  No Known Allergies        APPEARANCE: awake and alert in NAD. PAIN  6/10  SKIN: warm, dry and intact. No breakdown or bruising.  MUSCULOSKELETAL: Patient moving all extremities spontaneously, no obvious swelling or deformities noted. Ambulates independently.Back pain.   RESPIRATORY: Denies shortness of breath.Respirations unlabored.   CARDIAC: Denies CP, 2+ distal pulses; no peripheral edema  ABDOMEN: S/ND/NT, Denies nausea  : voids spontaneously, denies difficulty  Neurologic: AAO x 4; follows commands equal strength in all extremities; denies numbness/tingling. Denies dizziness

## 2025-01-02 NOTE — HPI
iKshan Dow is a 74 y.o. male with PMHx significant for DM II, HTN, HLD, CAD admitted to hospital medicine for volume overload and Covid 19. Patient reports B thoracic back pain that is worse with deep breaths and associated productive cough, SOB, sore throat, and fatigue for the past few days. Patient's daughter reports that at baseline, patient is ambulatory and lives with her. She noted that today he appeared weak, and was having difficulty ambulating independently which prompted her to call EMS. Denies fever/chills, diaphoresis, lightheadedness, HA, abdominal pain, n/v/d, urinary symptoms, changes in BMs, numbness/tingling, weakness.     In the ED, afebrile with HR 72, RR 18, /70. SpO2 92% on RA. WBC WNL. Na 134. Cr 1.3. Glucose 171. T bili 1.4. BNP 1068 (no Hx of CHF). HS Trop 62. Covid 19 +. CXR with atherosclerotic calcification present within the thoracic aorta. Pulmonary vasculature is within normal limits. The lungs are free of focal consolidations. There is no evidence for pneumothorax or large pleural effusions. Given duo neb x1, tylenol x1, lasix 40 mg x 1, and dexamethasone x1.

## 2025-01-02 NOTE — ASSESSMENT & PLAN NOTE
Patient's blood pressure range in the last 24 hours was: BP  Min: 156/70  Max: 156/70.The patient's inpatient anti-hypertensive regimen is listed below:  Current Antihypertensives  furosemide injection 20 mg, 2 times daily, Intravenous    Plan  - BP is controlled, no changes needed to their regimen  - No antihypertensives on MAR  - tele

## 2025-01-02 NOTE — ED NOTES
Bed: [facility]  Expected date: 1/1/25  Expected time: 9:46 AM  Means of arrival:   Comments:

## 2025-01-02 NOTE — ASSESSMENT & PLAN NOTE
Patient is identified as Severe COVID-19 based on hypoxemia with O2 saturations <94% on room air or on ambulation   Initiate standard COVID protocols; COVID-19 testing ,Infection Control notification  and isolation- respiratory, contact and droplet per protocol    Diagnostics: CBC, CMP, CRP, BNP, Troponin, and Portable CXR    Management: Initiate targeted therapy with Remdesivir, 200mg IV x1, followed by 100mg IV daily x5 days total, Dexamethasone PO/IV 6mg daily x10 days, and Anticoagulation, Patient admitted to non-critical care unit- Will initiate full dose anticoagulation with Weight based lovenox 1mg/kg IV q12, Inhaled bronchodilators as needed for shortness of breath., and Continuous cardiac monitoring.    Advance Care Planning  Current advance care plan has not been discussed with patient/family/POA and patient currently wishes Full Code.

## 2025-01-02 NOTE — ASSESSMENT & PLAN NOTE
CAD  HLD  - CXR unremarkable  - EKG pending  - Initial troponin 62, continue to trend.  - Continue ASA, statin, plavix  - Cardiac monitoring  - PRN EKG and sl nitro for chest pain  - K>4, Mg>2  - ECHO pending

## 2025-01-02 NOTE — ED PROVIDER NOTES
"Encounter Date: 1/2/2025       History     Chief Complaint   Patient presents with    Back Pain     Back pain starting a few days ago, also endorsing ear fullness and sore throat     75 yo M with pmhx DM, HTN, HLD, CAD presents with a chief complaint of back pain.  Patient endorses "pain in lungs" for the past few days.  He notes pain is present throughout bilateral upper thoracic spine.  Associated with cough productive of green sputum.  He notes ears "popping" as well as a sore throat.  A bit fatigued as well.  He denies any chest pain or fevers but does admit to some shortness of breath.  History is assisted by his daughter Katherine who was reached at [phone].  She notes that at baseline patient is ambulatory and lives with her.  He has been complaining of back pain and shortness of breath since yesterday.  Today he appeared weak and had difficulty ambulating independently which is able to do at baseline so called an ambulance        Review of patient's allergies indicates:  No Known Allergies  Past Medical History:   Diagnosis Date    Anticoagulant long-term use     Coronary artery disease     Diabetes mellitus     High cholesterol     Hypertension      Past Surgical History:   Procedure Laterality Date    CORONARY ANGIOPLASTY WITH STENT PLACEMENT      EXPLORATION, WOUND, UPPER EXTREMITY Right 5/3/2024    Procedure: EXPLORATION, WOUND, UPPER EXTREMITY;  Surgeon: Mayi Smith MD;  Location: [facility] OR;  Service: Orthopedics;  Laterality: Right;    EXTERNAL EAR SURGERY      Stents in legs      WOUND DEBRIDEMENT Right 5/3/2024    Procedure: DEBRIDEMENT, WOUND;  Surgeon: Mayi Smith MD;  Location: [facility] OR;  Service: Orthopedics;  Laterality: Right;     No family history on file.  Social History     Tobacco Use    Smoking status: Every Day     Current packs/day: 0.50     Average packs/day: 0.5 packs/day for 59.0 years (29.5 ttl pk-yrs)     Types: Cigarettes     Start date: 1/1/1966    Smokeless " tobacco: Never   Substance Use Topics    Alcohol use: No    Drug use: No     Review of Systems    Physical Exam     Initial Vitals [01/02/25 1158]   BP Pulse Resp Temp SpO2   (!) 156/70 72 18 98.4 °F (36.9 °C) 96 %      MAP       --         Physical Exam    Nursing note and vitals reviewed.  Constitutional: He appears well-developed and well-nourished. He is not diaphoretic. No distress.   Fatigued and deconditioned   HENT:   Head: Normocephalic and atraumatic.   Right TM occluded by cerumen  Left TM clear  Posterior oropharynx erythematous without tonsillar enlargement or exudate   Eyes: Right eye exhibits no discharge. Left eye exhibits no discharge. No scleral icterus.   Neck: Neck supple. No JVD present.   Normal range of motion.  Cardiovascular:  Normal rate, regular rhythm and normal heart sounds.     Exam reveals no gallop and no friction rub.       No murmur heard.  Pulmonary/Chest: No respiratory distress. He has no rhonchi. He has no rales.   Speaking complete sentences on room air but faint end expiratory wheezes at left base   Abdominal: Abdomen is soft. He exhibits no distension and no mass. There is no abdominal tenderness. There is no rebound and no guarding.   Musculoskeletal:         General: No tenderness. Normal range of motion.      Cervical back: Normal range of motion and neck supple.      Comments: No midline tenderness to cervical, thoracic, lumbar spine  No calf asymmetry, cords     Neurological: He is alert. He has normal strength.   Skin: Skin is warm and dry. Capillary refill takes less than 2 seconds.   Psychiatric:   Delayed verbal responses         ED Course   Procedures  Labs Reviewed   SARS-COV2 (COVID) WITH FLU/RSV BY PCR - Abnormal       Result Value    SARS-CoV2 (COVID-19) Qualitative PCR Detected (*)     Influenza A, Molecular Not Detected      Influenza B, Molecular Not Detected      RSV Ag by Molecular Method Not Detected     CBC W/ AUTO DIFFERENTIAL - Abnormal    WBC 7.41       RBC 4.84      Hemoglobin 15.0      Hematocrit 44.3      MCV 92      MCH 31.0      MCHC 33.9      RDW 13.0      Platelets 248      MPV 8.9 (*)     Immature Granulocytes 0.7 (*)     Gran # (ANC) 5.5      Immature Grans (Abs) 0.05 (*)     Lymph # 1.0      Mono # 0.9      Eos # 0.0      Baso # 0.02      nRBC 0      Gran % 74.1 (*)     Lymph % 13.2 (*)     Mono % 11.6      Eosinophil % 0.1      Basophil % 0.3      Differential Method Automated     COMPREHENSIVE METABOLIC PANEL - Abnormal    Sodium 134 (*)     Potassium 4.2      Chloride 97      CO2 26      Glucose 171 (*)     BUN 21      Creatinine 1.3      Calcium 9.7      Total Protein 7.6      Albumin 4.0      Total Bilirubin 1.4 (*)     Alkaline Phosphatase 94      AST 19      ALT 9 (*)     eGFR 57.3 (*)     Anion Gap 11     TROPONIN I HIGH SENSITIVITY - Abnormal    Troponin I High Sensitivity 62 (*)    B-TYPE NATRIURETIC PEPTIDE - Abnormal    BNP 1,068 (*)           Imaging Results              X-Ray Chest PA And Lateral (Final result)  Result time 01/02/25 14:28:54      Final result by Vlad Arellano MD (01/02/25 14:28:54)                   Impression:      No acute chest disease identified.      Electronically signed by: Vlad Arellano MD  Date:    01/02/2025  Time:    14:28               Narrative:    EXAMINATION:  XR CHEST PA AND LATERAL    CLINICAL HISTORY:  Cough, unspecified    TECHNIQUE:  PA and lateral views of the chest were performed.    COMPARISON:  10/04/2021.    FINDINGS:  The heart is not enlarged.  There is atherosclerotic calcification present within the thoracic aorta.  Pulmonary vasculature is within normal limits.  The lungs are free of focal consolidations.  There is no evidence for pneumothorax or large pleural effusions.  Bony structures are grossly intact.                                       Medications   furosemide injection 40 mg (has no administration in time range)   dexAMETHasone injection 6 mg (has no administration in time range)    albuterol-ipratropium 2.5 mg-0.5 mg/3 mL nebulizer solution 3 mL (3 mLs Nebulization Given 1/2/25 1445)   acetaminophen tablet 650 mg (650 mg Oral Given 1/2/25 1427)     Medical Decision Making  75 yo M with pmhx DM, HTN, HLD, CAD presents with a chief complaint of back pain, shortness of breath, cough, fatigue.    Differential includes, but not limited to: Viral URI, COVID, influenza, RSV, pneumonia, pneumothorax, CHF, ACS muscular pain, bronchitis    Will administer acetaminophen and albuterol nebulizer.  Will obtain labs and chest x-ray.    Reassessment:  Patient tested positive for COVID-19.  Influenza and RSV are negative.  Chest x-ray negative for acute process.  CBC without leukocytosis or anemia.  CMP with hyponatremia of 134.  Hyperglycemia of 171.  Elevated total bilirubin of 1.4.  BNP elevated at 1068.  High sensitivity troponin elevated at 62.  SpO2 92% on room air.  On repeat assessment he is breathing comfortably.  Patient with no known history of heart failure.  Given the hypoxia of 92%, will administer 6 mg of Decadron.  Will trend troponin.  Will admit for COVID-19 hypoxia with the associated cardiac enzyme dysfunction.      Amount and/or Complexity of Data Reviewed  Labs: ordered.  Radiology: ordered.    Risk  OTC drugs.  Prescription drug management.  Decision regarding hospitalization.                                      Clinical Impression:  Final diagnoses:  [R06.02] Shortness of breath  [R05.9] Cough  [U07.1] COVID-19 (Primary)  [I50.9] New onset of congestive heart failure  [R79.89] Troponin level elevated  [R09.02] Hypoxia          ED Disposition Condition    Admit Stable                Ghanshyam Catalan MD  01/02/25 8868     #Acute hypoxic resp failure, due to covid  empiric lovenox therapeutic due to concern for PE  requiring continuous bipap  decadron 6  cta not definitive for pe, va duplex neg  hold lasix for now  s/p rdv 8/12  s/p toci  f/u pulm  tpn per nutrition    #Progress Note Handoff:  Pending (specify):  Consults_________, Tests________, Test Results_______, Other____hypoxia, f/u pulm_____  Family discussion: d/w pt at bedside re: treatment plan, primary dx  Disposition: Home___/SNF___/Other________/Unknown at this time___x_____ #Acute hypoxic resp failure, due to covid  empiric lovenox therapeutic due to concern for PE  requiring continuous bipap  decadron 6  cta not definitive for pe, va duplex neg  s/p rdv 8/12  s/p toci  f/u pulm    #Progress Note Handoff:  Pending (specify):  Consults_________, Tests________, Test Results_______, Other____f/u pulm_____  Family discussion: d/w pt at bedside re: treatment plan, primary dx  Disposition: Home___/SNF___/Other________/Unknown at this time___x_____ #Acute hypoxic resp failure, due to covid  empiric lovenox therapeutic due to concern for PE  down to high flow 60 lpm, 100%  decadron 6  cta not definitive for pe, va duplex neg  s/p rdv 8/12  s/p toci  f/u pulm  tpn per nutrition; wean off if able    #Progress Note Handoff:  Pending (specify):  Consults_________, Tests________, Test Results_______, Other____hypoxia, f/u pulm_____  Family discussion: d/w pt at bedside re: treatment plan, primary dx  Disposition: Home___/SNF___/Other________/Unknown at this time___x_____ I have personally seen and examined this patient.  I have fully participated in the care of this patient.  I have reviewed pertinent clinical information, including history, physical exam, plan and note.   I have reviewed relevant imaging and diagnostic studies personally. I agree with resident note above and plan of care, edited and corrected where applicable.     Patient with acute hypoxic respiratory failure complicating COVID19 pneumonia   Severe COVID19 19 pneumonia   Asthma/HTN on therapy   Hyponatremia (possibly SIADH) being Followed up closely     PLAN  .. As per note above. Patient on intravenous Remdesivir / steroids   .. Non-invasive positive pressure ventilation (NIPPV) ; patient remains at high risk for requiring mechanical ventilation despite all support    Case discussed with senior resident assigned. IMPRESSION:    Acute hypoxemic resp failure  Severe covid pneumonia  Probable bacterial superinfection  Hyponatremia-resolved  HO Gastritis (recently diagnosed)  HO diverticulitis    Plan as outlined above IMPRESSION:    Acute hypoxemic resp failure on 90%  Severe COVID pneumonia  Probable bacterial superinfection  Hyponatremia-improving    Plan as outlined above IMPRESSION:    Acute hypoxemic resp failure  Severe covid pneumonia  Probable bacterial superinfection  Hyponatremia-improving  HO Gastritis (recently diagnosed)  HO diverticulitis    Plan as outlined above #Acute hypoxic resp failure, due to covid  empiric lovenox therapeutic due to concern for PE  requiring continuous bipap  decadron 6  cta not definitive for pe, va duplex neg  s/p rdv 8/12  s/p toci  f/u pulm    #Progress Note Handoff:  Pending (specify):  Consults_________, Tests________, Test Results_______, Other____f/u pulm_____  Family discussion: d/w pt at bedside re: treatment plan, primary dx  Disposition: Home___/SNF___/Other________/Unknown at this time___x_____

## 2025-01-02 NOTE — H&P
[facility] - Emergency Dept  [facility] Medicine  History & Physical    Patient Name: Kishan Dow  MRN: [id number]  Patient Class: IP- Psych  Admission Date: 1/2/2025  Attending Physician: Fran Stinson MD   Primary Care Provider: Michelle Primary Doctor         Patient information was obtained from patient, relative(s), and ER records.     Subjective:     Principal Problem:COVID-19    Chief Complaint:   Chief Complaint   Patient presents with    Back Pain     Back pain starting a few days ago, also endorsing ear fullness and sore throat        HPI: Kishan Dow is a 74 y.o. male with PMHx significant for DM II, HTN, HLD, CAD admitted to hospital medicine for volume overload and Covid 19. Patient reports B thoracic back pain that is worse with deep breaths and associated productive cough, SOB, sore throat, and fatigue for the past few days. Patient's daughter reports that at baseline, patient is ambulatory and lives with her. She noted that today he appeared weak, and was having difficulty ambulating independently which prompted her to call EMS. Denies fever/chills, diaphoresis, lightheadedness, HA, abdominal pain, n/v/d, urinary symptoms, changes in BMs, numbness/tingling, weakness.     In the ED, afebrile with HR 72, RR 18, /70. SpO2 92% on RA. WBC WNL. Na 134. Cr 1.3. Glucose 171. T bili 1.4. BNP 1068 (no Hx of CHF). HS Trop 62. Covid 19 +. CXR with atherosclerotic calcification present within the thoracic aorta. Pulmonary vasculature is within normal limits. The lungs are free of focal consolidations. There is no evidence for pneumothorax or large pleural effusions. Given duo neb x1, tylenol x1, lasix 40 mg x 1, and dexamethasone x1.     Past Medical History:   Diagnosis Date    Anticoagulant long-term use     Coronary artery disease     Diabetes mellitus     High cholesterol     Hypertension        Past Surgical History:   Procedure Laterality Date    CORONARY ANGIOPLASTY WITH STENT PLACEMENT       EXPLORATION, WOUND, UPPER EXTREMITY Right 5/3/2024    Procedure: EXPLORATION, WOUND, UPPER EXTREMITY;  Surgeon: Mayi Smith MD;  Location: [facility] OR;  Service: Orthopedics;  Laterality: Right;    EXTERNAL EAR SURGERY      Stents in legs      WOUND DEBRIDEMENT Right 5/3/2024    Procedure: DEBRIDEMENT, WOUND;  Surgeon: Mayi Smith MD;  Location: [facility] OR;  Service: Orthopedics;  Laterality: Right;       Review of patient's allergies indicates:  No Known Allergies    Current Facility-Administered Medications on File Prior to Encounter   Medication    ceFAZolin 2 g in dextrose 5 % in water (D5W) 50 mL IVPB (MB+)     Current Outpatient Medications on File Prior to Encounter   Medication Sig    atorvastatin (LIPITOR) 80 MG tablet Take 80 mg by mouth every evening.    glyBURIDE (DIABETA) 5 MG tablet Take 5 mg by mouth daily with breakfast.    aspirin (ECOTRIN) 81 MG EC tablet Take 81 mg by mouth once daily.    clopidogrel (PLAVIX) 75 mg tablet Take 75 mg by mouth once daily.    gabapentin (NEURONTIN) 300 MG capsule Take 300 mg by mouth 3 (three) times daily.    linagliptin (TRADJENTA) 5 mg Tab tablet Take 5 mg by mouth once daily.    loratadine (CLARITIN) 10 mg tablet Take 1 tablet (10 mg total) by mouth once daily.    losartan-hydrochlorothiazide 100-12.5 mg (HYZAAR) 100-12.5 mg Tab Take 1 tablet by mouth once daily.    metformin (GLUCOPHAGE) 1000 MG tablet Take 1,000 mg by mouth 2 (two) times daily with meals.    metoprolol succinate (TOPROL-XL) 100 MG 24 hr tablet Take 1 tablet (100 mg total) by mouth once daily.    nitroGLYCERIN (NITROSTAT) 0.3 MG SL tablet Place 0.3 mg under the tongue every 5 (five) minutes as needed for Chest pain.    omeprazole (PRILOSEC) 40 MG capsule Take 40 mg by mouth once daily.    pioglitazone (ACTOS) 45 MG tablet Take 45 mg by mouth once daily.    promethazine (PHENERGAN) 25 MG tablet Take 1 tablet (25 mg total) by mouth every 6 (six) hours as needed for Nausea.     thiamine (VITAMIN B-1) 100 MG tablet Take 100 mg by mouth once daily.    traMADoL (ULTRAM) 50 mg tablet Take 1 tablet (50 mg total) by mouth every 6 (six) hours as needed for Pain.    [DISCONTINUED] cilostazol (PLETAL) 100 MG Tab Take 50 mg by mouth 2 (two) times daily.    [DISCONTINUED] cilostazol (PLETAL) 50 MG Tab Take 50 mg by mouth 2 (two) times daily.     Family History    None       Tobacco Use    Smoking status: Every Day     Current packs/day: 0.50     Average packs/day: 0.5 packs/day for 59.0 years (29.5 ttl pk-yrs)     Types: Cigarettes     Start date: 1/1/1966    Smokeless tobacco: Never   Substance and Sexual Activity    Alcohol use: No    Drug use: No    Sexual activity: Not on file     Review of Systems   Constitutional:  Positive for fatigue. Negative for activity change, chills and fever.   HENT:  Negative for trouble swallowing.    Eyes:  Negative for photophobia and visual disturbance.   Respiratory:  Positive for cough and shortness of breath. Negative for chest tightness and wheezing.    Cardiovascular:  Negative for chest pain, palpitations and leg swelling.   Gastrointestinal:  Negative for abdominal pain, constipation, diarrhea, nausea and vomiting.   Genitourinary:  Negative for dysuria, frequency, hematuria and urgency.   Musculoskeletal:  Negative for arthralgias, back pain and gait problem.   Skin:  Negative for color change and rash.   Neurological:  Negative for dizziness, syncope, weakness, light-headedness, numbness and headaches.   Psychiatric/Behavioral:  Negative for agitation and confusion. The patient is not nervous/anxious.      Objective:     Vital Signs (Most Recent):  Temp: 100.1 °F (37.8 °C) (01/02/25 1553)  Pulse: 71 (01/02/25 1553)  Resp: 16 (01/02/25 1553)  BP: (!) 105/51 (01/02/25 1553)  SpO2: 95 % (01/02/25 1553) Vital Signs (24h Range):  Temp:  [98.4 °F (36.9 °C)-100.1 °F (37.8 °C)] 100.1 °F (37.8 °C)  Pulse:  [69-72] 71  Resp:  [16-18] 16  SpO2:  [92 %-96 %] 95 %  BP:  (105-156)/(51-70) 105/51     Weight: 50.8 kg (111 lb 15.9 oz)  Body mass index is 18.08 kg/m².     Physical Exam  Vitals and nursing note reviewed.   Constitutional:       General: He is not in acute distress.     Appearance: Normal appearance. He is not ill-appearing.   HENT:      Head: Normocephalic and atraumatic.      Right Ear: External ear normal.      Left Ear: External ear normal.   Eyes:      Extraocular Movements: Extraocular movements intact.      Conjunctiva/sclera: Conjunctivae normal.      Pupils: Pupils are equal, round, and reactive to light.   Cardiovascular:      Rate and Rhythm: Normal rate and regular rhythm.      Pulses: Normal pulses.      Heart sounds: Normal heart sounds. No murmur heard.  Pulmonary:      Effort: Pulmonary effort is normal. No respiratory distress.      Breath sounds: Normal breath sounds. No rales.      Comments: On 2L NC  Abdominal:      General: Abdomen is flat. Bowel sounds are normal. There is no distension.      Tenderness: There is no abdominal tenderness.   Musculoskeletal:         General: Normal range of motion.      Cervical back: Normal range of motion and neck supple.      Right lower leg: No edema.      Left lower leg: No edema.   Skin:     General: Skin is warm and dry.      Capillary Refill: Capillary refill takes less than 2 seconds.      Coloration: Skin is not jaundiced.   Neurological:      General: No focal deficit present.      Mental Status: He is alert and oriented to person, place, and time. Mental status is at baseline.      Cranial Nerves: No cranial nerve deficit.   Psychiatric:         Mood and Affect: Mood normal.         Behavior: Behavior normal.              CRANIAL NERVES     CN III, IV, VI   Pupils are equal, round, and reactive to light.       Significant Labs: All pertinent labs within the past 24 hours have been reviewed.  BMP:   Recent Labs   Lab 01/02/25  1415   *   *   K 4.2   CL 97   CO2 26   BUN 21   CREATININE 1.3    CALCIUM 9.7     CBC:   Recent Labs   Lab 01/02/25  1415   WBC 7.41   HGB 15.0   HCT 44.3        CMP:   Recent Labs   Lab 01/02/25  1415   *   K 4.2   CL 97   CO2 26   *   BUN 21   CREATININE 1.3   CALCIUM 9.7   PROT 7.6   ALBUMIN 4.0   BILITOT 1.4*   ALKPHOS 94   AST 19   ALT 9*   ANIONGAP 11     Cardiac Markers:   Recent Labs   Lab 01/02/25  1415   BNP 1,068*     Troponin:   Recent Labs   Lab 01/02/25  1415   TROPONINIHS 62*       Significant Imaging: I have reviewed all pertinent imaging results/findings within the past 24 hours.    Imaging Results              X-Ray Chest PA And Lateral (Final result)  Result time 01/02/25 14:28:54      Final result by Vlad Arellano MD (01/02/25 14:28:54)                   Impression:      No acute chest disease identified.      Electronically signed by: Vlad Arellano MD  Date:    01/02/2025  Time:    14:28               Narrative:    EXAMINATION:  XR CHEST PA AND LATERAL    CLINICAL HISTORY:  Cough, unspecified    TECHNIQUE:  PA and lateral views of the chest were performed.    COMPARISON:  10/04/2021.    FINDINGS:  The heart is not enlarged.  There is atherosclerotic calcification present within the thoracic aorta.  Pulmonary vasculature is within normal limits.  The lungs are free of focal consolidations.  There is no evidence for pneumothorax or large pleural effusions.  Bony structures are grossly intact.                                      Assessment/Plan:     * COVID-19  Patient is identified as Severe COVID-19 based on hypoxemia with O2 saturations <94% on room air or on ambulation   Initiate standard COVID protocols; COVID-19 testing ,Infection Control notification  and isolation- respiratory, contact and droplet per protocol    Diagnostics: CBC, CMP, CRP, BNP, Troponin, and Portable CXR    Management: Initiate targeted therapy with Remdesivir, 200mg IV x1, followed by 100mg IV daily x5 days total, Dexamethasone PO/IV 6mg daily x10 days, and  "Anticoagulation, Patient admitted to non-critical care unit- Will initiate full dose anticoagulation with Weight based lovenox 1mg/kg IV q12, Inhaled bronchodilators as needed for shortness of breath., and Continuous cardiac monitoring.    Advance Care Planning Current advance care plan has not been discussed with patient/family/POA and patient currently wishes Full Code.     Elevated troponin  CAD  HLD  - CXR unremarkable  - EKG pending  - Initial troponin 62, continue to trend.  - Continue ASA, statin, plavix  - Cardiac monitoring  - PRN EKG and sl nitro for chest pain  - K>4, Mg>2  - ECHO pending      Elevated brain natriuretic peptide (BNP) level  - BNP >1000  - CXR unremarkable  - given lasix 40mg IV x 1 in the ED, will hold off on additional lasix for today  - started on Lasix 20mg IV starting tomorrow as patient is lasix naive, can up titrate as needed  - ECHO pending  - low sodium diet  - 1.5L fluid restriction  - strict input/output      Hypertension  Patient's blood pressure range in the last 24 hours was: BP  Min: 156/70  Max: 156/70.The patient's inpatient anti-hypertensive regimen is listed below:  Current Antihypertensives  furosemide injection 20 mg, 2 times daily, Intravenous    Plan  - BP is controlled, no changes needed to their regimen  - No antihypertensives on MAR  - tele    Diabetes mellitus  Patient's FSGs are uncontrolled due to hyperglycemia on current medication regimen.  Last A1c reviewed- No results found for: "LABA1C", "HGBA1C"  Most recent fingerstick glucose reviewed- No results for input(s): "POCTGLUCOSE" in the last 24 hours.  Current correctional scale  Low  Maintain anti-hyperglycemic dose as follows-   Antihyperglycemics (From admission, onward)      Start     Stop Route Frequency Ordered    01/03/25 0900  insulin glargine U-100 (Lantus) pen 5 Units         -- SubQ Daily 01/02/25 1538    01/02/25 1637  insulin aspart U-100 pen 0-5 Units         -- SubQ Before meals & nightly PRN " 01/02/25 1538          Hold Oral hypoglycemics while patient is in the hospital.    Arteriosclerosis of coronary artery  Patient with known CAD which is controlled Will continue ASA, Plavix, and Statin and monitor for S/Sx of angina/ACS. Continue to monitor on telemetry.       VTE Risk Mitigation (From admission, onward)           Ordered     enoxaparin injection 50 mg  2 times daily         01/02/25 1530     IP VTE HIGH RISK PATIENT  Once         01/02/25 1528     Place sequential compression device  Until discontinued         01/02/25 1528                                    Tori Bhandari PA-C  Department of Hospital Medicine  [facility] - Emergency Dept

## 2025-01-02 NOTE — Clinical Note
Diagnosis: COVID-19 [[id number]]   Reason for IP Medical Treatment  (Clinical interventions that can only be accomplished in the IP setting? ) :: covid

## 2025-01-03 PROBLEM — N17.9 AKI (ACUTE KIDNEY INJURY): Status: ACTIVE | Noted: 2025-01-03

## 2025-01-03 LAB
ANION GAP SERPL CALC-SCNC: 13 MMOL/L (ref 8–16)
ASCENDING AORTA: 2.94 CM
AV AREA BY CONTINUOUS VTI: 1.8 CM2
AV INDEX (PROSTH): 0.56
AV LVOT MEAN GRADIENT: 1 MMHG
AV LVOT PEAK GRADIENT: 1 MMHG
AV MEAN GRADIENT: 2.9 MMHG
AV PEAK GRADIENT: 4.8 MMHG
AV VALVE AREA BY VELOCITY RATIO: 1.7 CM²
AV VALVE AREA: 1.8 CM2
AV VELOCITY RATIO: 0.55
BASOPHILS # BLD AUTO: 0.02 K/UL (ref 0–0.2)
BASOPHILS NFR BLD: 0.2 % (ref 0–1.9)
BSA FOR ECHO PROCEDURE: 1.53 M2
BUN SERPL-MCNC: 36 MG/DL (ref 8–23)
CALCIUM SERPL-MCNC: 8.7 MG/DL (ref 8.7–10.5)
CHLORIDE SERPL-SCNC: 98 MMOL/L (ref 95–110)
CO2 SERPL-SCNC: 24 MMOL/L (ref 23–29)
CREAT SERPL-MCNC: 1.6 MG/DL (ref 0.5–1.4)
CV ECHO LV RWT: 0.48 CM
DIFFERENTIAL METHOD BLD: ABNORMAL
DOP CALC AO PEAK VEL: 1.1 M/S
DOP CALC AO VTI: 18.5 CM
DOP CALC LVOT AREA: 3.1 CM2
DOP CALC LVOT DIAMETER: 2 CM
DOP CALC LVOT PEAK VEL: 0.6 M/S
DOP CALC LVOT STROKE VOLUME: 32.7 CM3
DOP CALCLVOT PEAK VEL VTI: 10.4 CM
E WAVE DECELERATION TIME: 216.08 MS
E/A RATIO: 0.83
E/E' RATIO: 8.91 M/S
ECHO EF ESTIMATED: 62 %
ECHO LV POSTERIOR WALL: 1 CM (ref 0.6–1.1)
EOSINOPHIL # BLD AUTO: 0 K/UL (ref 0–0.5)
EOSINOPHIL NFR BLD: 0 % (ref 0–8)
ERYTHROCYTE [DISTWIDTH] IN BLOOD BY AUTOMATED COUNT: 13.1 % (ref 11.5–14.5)
EST. GFR  (NO RACE VARIABLE): 44.7 ML/MIN/1.73 M^2
ESTIMATED AVG GLUCOSE: 151 MG/DL (ref 68–131)
FRACTIONAL SHORTENING: 33.3 % (ref 28–44)
GLUCOSE SERPL-MCNC: 156 MG/DL (ref 70–110)
HBA1C MFR BLD: 6.9 % (ref 4–5.6)
HCT VFR BLD AUTO: 45.7 % (ref 40–54)
HGB BLD-MCNC: 14.8 G/DL (ref 14–18)
IMM GRANULOCYTES # BLD AUTO: 0.05 K/UL (ref 0–0.04)
IMM GRANULOCYTES NFR BLD AUTO: 0.5 % (ref 0–0.5)
INTERVENTRICULAR SEPTUM: 0.8 CM (ref 0.6–1.1)
LA MAJOR: 4.1 CM
LA MINOR: 3.96 CM
LA WIDTH: 2.76 CM
LEFT ATRIUM SIZE: 2.81 CM
LEFT ATRIUM VOLUME INDEX MOD: 19.5 ML/M2
LEFT ATRIUM VOLUME INDEX: 17.1 ML/M2
LEFT ATRIUM VOLUME MOD: 30.29 ML
LEFT ATRIUM VOLUME: 26.56 CM3
LEFT INTERNAL DIMENSION IN SYSTOLE: 2.8 CM (ref 2.1–4)
LEFT VENTRICLE DIASTOLIC VOLUME INDEX: 51.3 ML/M2
LEFT VENTRICLE DIASTOLIC VOLUME: 79.51 ML
LEFT VENTRICLE MASS INDEX: 76.6 G/M2
LEFT VENTRICLE SYSTOLIC VOLUME INDEX: 19.4 ML/M2
LEFT VENTRICLE SYSTOLIC VOLUME: 30.08 ML
LEFT VENTRICULAR INTERNAL DIMENSION IN DIASTOLE: 4.2 CM (ref 3.5–6)
LEFT VENTRICULAR MASS: 118.7 G
LV LATERAL E/E' RATIO: 8.17
LV SEPTAL E/E' RATIO: 9.8
LYMPHOCYTES # BLD AUTO: 1.4 K/UL (ref 1–4.8)
LYMPHOCYTES NFR BLD: 13.4 % (ref 18–48)
MAGNESIUM SERPL-MCNC: 2.1 MG/DL (ref 1.6–2.6)
MCH RBC QN AUTO: 29.7 PG (ref 27–31)
MCHC RBC AUTO-ENTMCNC: 32.4 G/DL (ref 32–36)
MCV RBC AUTO: 92 FL (ref 82–98)
MONOCYTES # BLD AUTO: 0.8 K/UL (ref 0.3–1)
MONOCYTES NFR BLD: 8.3 % (ref 4–15)
MV PEAK A VEL: 0.59 M/S
MV PEAK E VEL: 0.49 M/S
NEUTROPHILS # BLD AUTO: 7.8 K/UL (ref 1.8–7.7)
NEUTROPHILS NFR BLD: 77.6 % (ref 38–73)
NRBC BLD-RTO: 0 /100 WBC
OHS CV RV/LV RATIO: 0.81 CM
PLATELET # BLD AUTO: 269 K/UL (ref 150–450)
PMV BLD AUTO: 8.7 FL (ref 9.2–12.9)
POCT GLUCOSE: 152 MG/DL (ref 70–110)
POTASSIUM SERPL-SCNC: 4.1 MMOL/L (ref 3.5–5.1)
RA MAJOR: 3.84 CM
RA PRESSURE ESTIMATED: 3 MMHG
RA WIDTH: 2.36 CM
RBC # BLD AUTO: 4.99 M/UL (ref 4.6–6.2)
RIGHT VENTRICLE DIASTOLIC BASEL DIMENSION: 3.4 CM
SINUS: 2.88 CM
SODIUM SERPL-SCNC: 135 MMOL/L (ref 136–145)
STJ: 2.77 CM
TDI LATERAL: 0.06 M/S
TDI SEPTAL: 0.05 M/S
TDI: 0.06 M/S
TRICUSPID ANNULAR PLANE SYSTOLIC EXCURSION: 2 CM
WBC # BLD AUTO: 10.08 K/UL (ref 3.9–12.7)
Z-SCORE OF LEFT VENTRICULAR DIMENSION IN END DIASTOLE: -0.66
Z-SCORE OF LEFT VENTRICULAR DIMENSION IN END SYSTOLE: 0.04

## 2025-01-03 PROCEDURE — 80048 BASIC METABOLIC PNL TOTAL CA: CPT | Performed by: STUDENT IN AN ORGANIZED HEALTH CARE EDUCATION/TRAINING PROGRAM

## 2025-01-03 PROCEDURE — 25000003 PHARM REV CODE 250: Performed by: PHYSICIAN ASSISTANT

## 2025-01-03 PROCEDURE — 83735 ASSAY OF MAGNESIUM: CPT | Performed by: STUDENT IN AN ORGANIZED HEALTH CARE EDUCATION/TRAINING PROGRAM

## 2025-01-03 PROCEDURE — 21400001 HC TELEMETRY ROOM

## 2025-01-03 PROCEDURE — 83036 HEMOGLOBIN GLYCOSYLATED A1C: CPT | Performed by: PHYSICIAN ASSISTANT

## 2025-01-03 PROCEDURE — 85025 COMPLETE CBC W/AUTO DIFF WBC: CPT | Performed by: PHYSICIAN ASSISTANT

## 2025-01-03 PROCEDURE — 63600175 PHARM REV CODE 636 W HCPCS: Performed by: PHYSICIAN ASSISTANT

## 2025-01-03 PROCEDURE — 27000207 HC ISOLATION

## 2025-01-03 PROCEDURE — 25000003 PHARM REV CODE 250: Performed by: STUDENT IN AN ORGANIZED HEALTH CARE EDUCATION/TRAINING PROGRAM

## 2025-01-03 RX ORDER — GUAIFENESIN AND DEXTROMETHORPHAN HYDROBROMIDE 10; 100 MG/5ML; MG/5ML
10 SYRUP ORAL EVERY 4 HOURS PRN
Status: DISCONTINUED | OUTPATIENT
Start: 2025-01-03 | End: 2025-01-07 | Stop reason: HOSPADM

## 2025-01-03 RX ORDER — FUROSEMIDE 10 MG/ML
40 INJECTION INTRAMUSCULAR; INTRAVENOUS DAILY
Status: DISCONTINUED | OUTPATIENT
Start: 2025-01-03 | End: 2025-01-03

## 2025-01-03 RX ORDER — GABAPENTIN 300 MG/1
300 CAPSULE ORAL 2 TIMES DAILY
Status: DISCONTINUED | OUTPATIENT
Start: 2025-01-04 | End: 2025-01-07 | Stop reason: HOSPADM

## 2025-01-03 RX ORDER — ENOXAPARIN SODIUM 100 MG/ML
1 INJECTION SUBCUTANEOUS EVERY 24 HOURS
Status: DISCONTINUED | OUTPATIENT
Start: 2025-01-04 | End: 2025-01-05

## 2025-01-03 RX ADMIN — Medication 100 MG: at 08:01

## 2025-01-03 RX ADMIN — Medication 500 MG: at 08:01

## 2025-01-03 RX ADMIN — INSULIN GLARGINE 5 UNITS: 100 INJECTION, SOLUTION SUBCUTANEOUS at 08:01

## 2025-01-03 RX ADMIN — CLOPIDOGREL BISULFATE 75 MG: 75 TABLET ORAL at 08:01

## 2025-01-03 RX ADMIN — ATORVASTATIN CALCIUM 80 MG: 40 TABLET, FILM COATED ORAL at 09:01

## 2025-01-03 RX ADMIN — GUAIFENESIN AND DEXTROMETHORPHAN 10 ML: 100; 10 SYRUP ORAL at 02:01

## 2025-01-03 RX ADMIN — PANTOPRAZOLE SODIUM 40 MG: 40 TABLET, DELAYED RELEASE ORAL at 08:01

## 2025-01-03 RX ADMIN — REMDESIVIR 100 MG: 100 INJECTION, POWDER, LYOPHILIZED, FOR SOLUTION INTRAVENOUS at 08:01

## 2025-01-03 RX ADMIN — GABAPENTIN 300 MG: 300 CAPSULE ORAL at 08:01

## 2025-01-03 RX ADMIN — OXYCODONE HYDROCHLORIDE 5 MG: 5 TABLET ORAL at 09:01

## 2025-01-03 RX ADMIN — Medication 500 MG: at 09:01

## 2025-01-03 RX ADMIN — ENOXAPARIN SODIUM 50 MG: 60 INJECTION SUBCUTANEOUS at 08:01

## 2025-01-03 RX ADMIN — THERA TABS 1 TABLET: TAB at 08:01

## 2025-01-03 RX ADMIN — GABAPENTIN 300 MG: 300 CAPSULE ORAL at 02:01

## 2025-01-03 RX ADMIN — DEXAMETHASONE 6 MG: 4 TABLET ORAL at 08:01

## 2025-01-03 RX ADMIN — ASPIRIN 81 MG: 81 TABLET, COATED ORAL at 08:01

## 2025-01-03 NOTE — ED NOTES
Tele box requested x 1.   Render In Strict Bullet Format?: No Initiate Treatment: Mupirocin bid Detail Level: Zone Continue Regimen: Hydrocortisone 2.5% ointment

## 2025-01-03 NOTE — PLAN OF CARE
[facility] Dept  Initial Discharge Assessment       Primary Care Provider: Michelle, Primary Doctor    Admission Diagnosis: COVID-19 [U07.1]    Admission Date: 1/2/2025  Expected Discharge Date:     Pt is hard of hearing and SW could not talk loud enough for pt.  SW contacted daughter Katherine [phone] to complete assessment.     As per daughter pt is independent with his ADL's and ambulation and does not require assistance or equipment.  Pt can ambulate independently into their apartment on the second floor of a 2-story walk-up; approx 18 steps.     As per daughter pt does not require post acute services on d/c.  Pt to d/c home with no needs when ready    Transition of Care Barriers: (P) None    Payor: MEDICARE / Plan: MEDICARE PART A & B / Product Type: Government /     Extended Emergency Contact Information  Primary Emergency Contact: Zahida Dow  Address: [address] [place] [address]           [place] [address] [place]  Mobile Phone: [phone]  Relation: Spouse  Secondary Emergency Contact: Katherine Preston  Mobile Phone: [phone]  Relation: Daughter    Discharge Plan A: (P) Home  Discharge Plan B: (P) Home      [id number] - [place] - [address] AT [facility] [place] [facility] [facility]  [address]  [place]   [place] [address]  Phone: [phone] Fax: [phone]      Initial Assessment (most recent)       Adult Discharge Assessment - 01/03/25 1211          Discharge Assessment    Assessment Type Discharge Planning Assessment (P)      Confirmed/corrected address, phone number and insurance Yes (P)      Confirmed Demographics Correct on Facesheet (P)      Source of Information family (P)      If unable to respond/provide information was family/caregiver contacted? Yes (P)      Contact Name/Number daughter Katherine [phone] (P)      Does patient/caregiver understand observation status Yes (P)      Communicated BHAVIK with  patient/caregiver Yes (P)      Reason For Admission COVID-19 (P)      People in Home spouse;child(hilario), adult (P)      Facility Arrived From: home (P)      Do you expect to return to your current living situation? Yes (P)      Do you have help at home or someone to help you manage your care at home? No (P)      Prior to hospitilization cognitive status: Alert/Oriented;No Deficits (P)      Current cognitive status: Alert/Oriented (P)      Walking or Climbing Stairs Difficulty no (P)      Dressing/Bathing Difficulty no (P)      Home Accessibility not wheelchair accessible;stairs to enter home (P)      Number of Stairs, Main Entrance other (see comments) (P)    2nd floor of a walker-up about 18 steps to enter    Home Layout Able to live on 1st floor (P)      Equipment Currently Used at Home none (P)      Patient currently being followed by outpatient case management? No (P)      Do you currently have service(s) that help you manage your care at home? No (P)      Do you have any problems affording any of your prescribed medications? No (P)      Is the patient taking medications as prescribed? yes (P)      Who is going to help you get home at discharge? family/friends (P)      How do you get to doctors appointments? family or friend will provide (P)      Are you on dialysis? No (P)      Do you take coumadin? No (P)      Discharge Plan A Home (P)      Discharge Plan B Home (P)      DME Needed Upon Discharge  none (P)      Discharge Plan discussed with: Patient;Adult children (P)      Transition of Care Barriers None (P)         Physical Activity    On average, how many days per week do you engage in moderate to strenuous exercise (like a brisk walk)? 0 days (P)      On average, how many minutes do you engage in exercise at this level? 0 min (P)         Financial Resource Strain    How hard is it for you to pay for the very basics like food, housing, medical care, and heating? Not hard at all (P)         Housing Stability     In the last 12 months, was there a time when you were not able to pay the mortgage or rent on time? No (P)      At any time in the past 12 months, were you homeless or living in a shelter (including now)? No (P)         Transportation Needs    Has the lack of transportation kept you from medical appointments, meetings, work or from getting things needed for daily living? No (P)         Food Insecurity    Within the past 12 months, you worried that your food would run out before you got the money to buy more. Never true (P)      Within the past 12 months, the food you bought just didn't last and you didn't have money to get more. Never true (P)         Stress    Do you feel stress - tense, restless, nervous, or anxious, or unable to sleep at night because your mind is troubled all the time - these days? To some extent (P)         Social Isolation    How often do you feel lonely or isolated from those around you?  Never (P)         Alcohol Use    Q1: How often do you have a drink containing alcohol? Never (P)      Q2: How many drinks containing alcohol do you have on a typical day when you are drinking? Patient does not drink (P)      Q3: How often do you have six or more drinks on one occasion? Never (P)         Utilities    In the past 12 months has the electric, gas, oil, or water company threatened to shut off services in your home? No (P)         Health Literacy    How often do you need to have someone help you when you read instructions, pamphlets, or other written material from your doctor or pharmacy? Rarely (P)         OTHER    Name(s) of People in Home susana Teague and adult daughter Katherine (P)                    Lavern Hook CD, MSW, LMSW, RSW   Case Management  [facility]  Email: [email or web address]

## 2025-01-03 NOTE — PROGRESS NOTES
Pharmacist Renal Dose Adjustment Note    Kishan Dow is a 74 y.o. male being treated with the gabapentin 300 mg TID     Patient Data:    Vital Signs (Most Recent):  Temp: 98.3 °F (36.8 °C) (01/03/25 0745)  Pulse: 72 (01/03/25 1101)  Resp: 17 (01/03/25 0832)  BP: (!) 116/55 (01/03/25 1101)  SpO2: 95 % (01/03/25 1102) Vital Signs (72h Range):  Temp:  [98.3 °F (36.8 °C)-100.1 °F (37.8 °C)]   Pulse:  [62-86]   Resp:  [16-18]   BP: (105-174)/(51-75)   SpO2:  [92 %-99 %]      Recent Labs   Lab 01/02/25  1415 01/03/25  0445   CREATININE 1.3 1.6*     Serum creatinine: 1.6 mg/dL (H) 01/03/25 0445  Estimated creatinine clearance: 28.2 mL/min (A)    Adjusted to   Gabapentin 300 mg every twice daily per pharmacy renal adjustment protocol    Pharmacist's Name: Veronica Tyler  Pharmacist's Extension: [phone]

## 2025-01-03 NOTE — ASSESSMENT & PLAN NOTE
BROOK is likely due to pre-renal azotemia due to dehydration vs med vs less likely cardiorenal. Baseline creatinine is  0.9-1 . Most recent creatinine and eGFR are listed below.  Recent Labs     01/02/25  1415 01/03/25  0445   CREATININE 1.3 1.6*   EGFRNORACEVR 57.3* 44.7*      Plan  - BROOK is worsening. Will d/c IV lasix as appears dehydrated. Got one dose of IV lasix on admission per H&P. Encourage PO intake. Will consider fluids if worsening despite appropriate PO intake   - Avoid nephrotoxins and renally dose meds for GFR listed above  - Monitor urine output, serial BMP, and adjust therapy as needed  - Follow renal lytes

## 2025-01-03 NOTE — SUBJECTIVE & OBJECTIVE
Interval History: Seen and examined at bedside. Reports feeling better but still has shortness of breath and cough. Remains on 2 L NC. Lasix discontinued as Cxr and clinical exam not consistent with volume overload at this time.     Creatinine 1.6 today (baseline 0.9 -1) - Encourage PO intake     Review of Systems   Constitutional:  Positive for fatigue. Negative for activity change, chills and fever.   HENT:  Negative for trouble swallowing.    Eyes:  Negative for photophobia and visual disturbance.   Respiratory:  Positive for cough and shortness of breath. Negative for chest tightness and wheezing.    Cardiovascular:  Negative for chest pain, palpitations and leg swelling.   Gastrointestinal:  Negative for abdominal pain, constipation, diarrhea, nausea and vomiting.   Genitourinary:  Negative for dysuria, frequency, hematuria and urgency.   Musculoskeletal:  Negative for arthralgias, back pain and gait problem.   Skin:  Negative for color change and rash.   Neurological:  Negative for dizziness, syncope, weakness, light-headedness, numbness and headaches.   Psychiatric/Behavioral:  Negative for agitation and confusion. The patient is not nervous/anxious.      Objective:     Vital Signs (Most Recent):  Temp: 98.3 °F (36.8 °C) (01/03/25 0745)  Pulse: 75 (01/03/25 0832)  Resp: 17 (01/03/25 0832)  BP: (!) 107/58 (01/03/25 0832)  SpO2: 98 % (01/03/25 0832) Vital Signs (24h Range):  Temp:  [98.3 °F (36.8 °C)-100.1 °F (37.8 °C)] 98.3 °F (36.8 °C)  Pulse:  [62-86] 75  Resp:  [16-18] 17  SpO2:  [92 %-99 %] 98 %  BP: (105-174)/(51-75) 107/58     Weight: 50 kg (110 lb 3.7 oz)  Body mass index is 17.79 kg/m².    Intake/Output Summary (Last 24 hours) at 1/3/2025 1016  Last data filed at 1/3/2025 0857  Gross per 24 hour   Intake 491.47 ml   Output --   Net 491.47 ml      Physical Exam  Vitals and nursing note reviewed.   Constitutional:       General: He is not in acute distress.     Appearance: Normal appearance. He is not  ill-appearing.   HENT:      Head: Normocephalic and atraumatic.      Right Ear: External ear normal.      Left Ear: External ear normal.      Neck: JVP not seen  Eyes:      Extraocular Movements: Extraocular movements intact.      Conjunctiva/sclera: Conjunctivae normal.      Pupils: Pupils are equal, round, and reactive to light.   Cardiovascular:      Rate and Rhythm: Normal rate and regular rhythm.      Pulses: Normal pulses.      Heart sounds: Normal heart sounds. No murmur heard.  Pulmonary:      Effort: Pulmonary effort is normal. No respiratory distress.      Breath sounds: Scattered rhonchi     Comments: On 2L NC  Abdominal:      General: Abdomen is flat. Bowel sounds are normal. There is no distension.      Tenderness: There is no abdominal tenderness.   Musculoskeletal:         General: Normal range of motion.      Cervical back: Normal range of motion and neck supple.      Right lower leg: No edema.      Left lower leg: No edema.   Skin:     General: Skin is warm and dry.      Capillary Refill: Capillary refill takes less than 2 seconds.      Coloration: Skin is not jaundiced.   Neurological:      General: No focal deficit present.      Mental Status: He is alert and oriented to person, place, and time. Mental status is at baseline.      Cranial Nerves: No cranial nerve deficit.   Psychiatric:         Mood and Affect: Mood normal.         Behavior: Behavior normal.        Significant Labs: All pertinent labs within the past 24 hours have been reviewed.    Significant Imaging: I have reviewed all pertinent imaging results/findings within the past 24 hours.

## 2025-01-03 NOTE — PROGRESS NOTES
Pharmacist Renal Dose Adjustment Note    Kishan Dow is a 74 y.o. male being treated with enoxaparin 1 mg/kg (50 mg ) every 12 hours for Covid-19 pre-emptive      Patient Data:    Vital Signs (Most Recent):  Temp: 98.3 °F (36.8 °C) (01/03/25 0745)  Pulse: 72 (01/03/25 1101)  Resp: 17 (01/03/25 0832)  BP: (!) 116/55 (01/03/25 1101)  SpO2: 95 % (01/03/25 1102) Vital Signs (72h Range):  Temp:  [98.3 °F (36.8 °C)-100.1 °F (37.8 °C)]   Pulse:  [62-86]   Resp:  [16-18]   BP: (105-174)/(51-75)   SpO2:  [92 %-99 %]      Recent Labs   Lab 01/02/25  1415 01/03/25  0445   CREATININE 1.3 1.6*     Serum creatinine: 1.6 mg/dL (H) 01/03/25 0445  Estimated creatinine clearance: 28.2 mL/min (A)    Worsening renal function; pt receiving lasix.   Adjusted to   Enoxaparin 50 mg every 24 hours per pharmacy renal adjustment protocol     Pharmacist's Name: Veronica Tyler  Pharmacist's Extension: [phone]

## 2025-01-03 NOTE — ASSESSMENT & PLAN NOTE
Patient's FSGs are uncontrolled due to hyperglycemia on current medication regimen.  Last A1c reviewed-   Lab Results   Component Value Date    HGBA1C 6.9 (H) 01/03/2025     Most recent fingerstick glucose reviewed-   Recent Labs   Lab 01/02/25  1559 01/03/25  0833   POCTGLUCOSE 175* 152*     Current correctional scale  Low  Maintain anti-hyperglycemic dose as follows-   Antihyperglycemics (From admission, onward)    Start     Stop Route Frequency Ordered    01/03/25 0900  insulin glargine U-100 (Lantus) pen 5 Units         -- SubQ Daily 01/02/25 1538    01/02/25 1637  insulin aspart U-100 pen 0-5 Units         -- SubQ Before meals & nightly PRN 01/02/25 1538        Hold Oral hypoglycemics while patient is in the hospital.

## 2025-01-03 NOTE — ED NOTES
Report received from RAQUEL Maurer. Assume care of pt. Pt resting comfortably and independently repositioned in stretcher with bed locked in lowest position for safety. NAD noted at this time. Respirations even and unlabored and visible chest rise noted.  Patient offered bathroom assistance and denies need at this time. Pt instructed to call if assistance is needed. Pt on continuous cardiac, BP, and O2 monitoring. Call light within reach. No needs at this time. Will continue to monitor.

## 2025-01-03 NOTE — ASSESSMENT & PLAN NOTE
CAD  HLD  - CXR unremarkable  - EKG pending  - troponin 62 and 71 - delta not significant and no ACS signs -will continue to monitor and trend further if any concern for ACS  - Continue ASA, statin, plavix  - Cardiac monitoring  - PRN EKG and sl nitro for chest pain  - K>4, Mg>2  - ECHO unremarkable as above

## 2025-01-03 NOTE — ASSESSMENT & PLAN NOTE
- BNP >1000 on admission with mild bump in Troponin without significant delta - possibly 2/2 demand related to COVID infection  - CXR unremarkable  - given lasix 40mg IV x 1 in the ED with bump in creatinine  - hold off on further lasix at this time  - Encourage PO intake  - ECHO 1/3/2025 with normal systolic and diastolic dysfunction. 55 - 60%.IVC 3 mmHg  - d/c fluid restriction  - strict input/output

## 2025-01-03 NOTE — PROGRESS NOTES
[facility] Dept  Hospital Medicine  Progress Note    Patient Name: Kishan Dow  MRN: [id number]  Patient Class: IP- Inpatient   Admission Date: 1/2/2025  Length of Stay: 1 days  Attending Physician: Fran Stinson MD  Primary Care Provider: Michelle, Primary Doctor        Subjective     Principal Problem:COVID-19        HPI:  Kishan Dow is a 74 y.o. male with PMHx significant for DM II, HTN, HLD, CAD admitted to hospital medicine for volume overload and Covid 19. Patient reports B thoracic back pain that is worse with deep breaths and associated productive cough, SOB, sore throat, and fatigue for the past few days. Patient's daughter reports that at baseline, patient is ambulatory and lives with her. She noted that today he appeared weak, and was having difficulty ambulating independently which prompted her to call EMS. Denies fever/chills, diaphoresis, lightheadedness, HA, abdominal pain, n/v/d, urinary symptoms, changes in BMs, numbness/tingling, weakness.     In the ED, afebrile with HR 72, RR 18, /70. SpO2 92% on RA. WBC WNL. Na 134. Cr 1.3. Glucose 171. T bili 1.4. BNP 1068 (no Hx of CHF). HS Trop 62. Covid 19 +. CXR with atherosclerotic calcification present within the thoracic aorta. Pulmonary vasculature is within normal limits. The lungs are free of focal consolidations. There is no evidence for pneumothorax or large pleural effusions. Given duo neb x1, tylenol x1, lasix 40 mg x 1, and dexamethasone x1.     Overview/Hospital Course:  No notes on file    Interval History: Seen and examined at bedside. Reports feeling better but still has shortness of breath and cough. Remains on 2 L NC. Lasix discontinued as Cxr and clinical exam not consistent with volume overload at this time.     Creatinine 1.6 today (baseline 0.9 -1) - Encourage PO intake     Review of Systems   Constitutional:  Positive for fatigue. Negative for activity change, chills and fever.   HENT:  Negative for trouble  swallowing.    Eyes:  Negative for photophobia and visual disturbance.   Respiratory:  Positive for cough and shortness of breath. Negative for chest tightness and wheezing.    Cardiovascular:  Negative for chest pain, palpitations and leg swelling.   Gastrointestinal:  Negative for abdominal pain, constipation, diarrhea, nausea and vomiting.   Genitourinary:  Negative for dysuria, frequency, hematuria and urgency.   Musculoskeletal:  Negative for arthralgias, back pain and gait problem.   Skin:  Negative for color change and rash.   Neurological:  Negative for dizziness, syncope, weakness, light-headedness, numbness and headaches.   Psychiatric/Behavioral:  Negative for agitation and confusion. The patient is not nervous/anxious.      Objective:     Vital Signs (Most Recent):  Temp: 98.3 °F (36.8 °C) (01/03/25 0745)  Pulse: 75 (01/03/25 0832)  Resp: 17 (01/03/25 0832)  BP: (!) 107/58 (01/03/25 0832)  SpO2: 98 % (01/03/25 0832) Vital Signs (24h Range):  Temp:  [98.3 °F (36.8 °C)-100.1 °F (37.8 °C)] 98.3 °F (36.8 °C)  Pulse:  [62-86] 75  Resp:  [16-18] 17  SpO2:  [92 %-99 %] 98 %  BP: (105-174)/(51-75) 107/58     Weight: 50 kg (110 lb 3.7 oz)  Body mass index is 17.79 kg/m².    Intake/Output Summary (Last 24 hours) at 1/3/2025 1016  Last data filed at 1/3/2025 0857  Gross per 24 hour   Intake 491.47 ml   Output --   Net 491.47 ml      Physical Exam  Vitals and nursing note reviewed.   Constitutional:       General: He is not in acute distress.     Appearance: Normal appearance. He is not ill-appearing.   HENT:      Head: Normocephalic and atraumatic.      Right Ear: External ear normal.      Left Ear: External ear normal.      Neck: JVP not seen  Eyes:      Extraocular Movements: Extraocular movements intact.      Conjunctiva/sclera: Conjunctivae normal.      Pupils: Pupils are equal, round, and reactive to light.   Cardiovascular:      Rate and Rhythm: Normal rate and regular rhythm.      Pulses: Normal pulses.       Heart sounds: Normal heart sounds. No murmur heard.  Pulmonary:      Effort: Pulmonary effort is normal. No respiratory distress.      Breath sounds: Scattered rhonchi     Comments: On 2L NC  Abdominal:      General: Abdomen is flat. Bowel sounds are normal. There is no distension.      Tenderness: There is no abdominal tenderness.   Musculoskeletal:         General: Normal range of motion.      Cervical back: Normal range of motion and neck supple.      Right lower leg: No edema.      Left lower leg: No edema.   Skin:     General: Skin is warm and dry.      Capillary Refill: Capillary refill takes less than 2 seconds.      Coloration: Skin is not jaundiced.   Neurological:      General: No focal deficit present.      Mental Status: He is alert and oriented to person, place, and time. Mental status is at baseline.      Cranial Nerves: No cranial nerve deficit.   Psychiatric:         Mood and Affect: Mood normal.         Behavior: Behavior normal.        Significant Labs: All pertinent labs within the past 24 hours have been reviewed.    Significant Imaging: I have reviewed all pertinent imaging results/findings within the past 24 hours.    Assessment and Plan     * COVID-19  Patient is identified as Severe COVID-19 based on hypoxemia with O2 saturations <94% on room air or on ambulation   Initiate standard COVID protocols; COVID-19 testing ,Infection Control notification  and isolation- respiratory, contact and droplet per protocol    Diagnostics: CBC, CMP, CRP, BNP, Troponin, and Portable CXR    Management: Initiate targeted therapy with Remdesivir, 200mg IV x1, followed by 100mg IV daily x5 days total, Dexamethasone PO/IV 6mg daily x10 days, and Anticoagulation, Patient admitted to non-critical care unit- Will initiate full dose anticoagulation with Weight based lovenox 1mg/kg IV q12, Inhaled bronchodilators as needed for shortness of breath., and Continuous cardiac monitoring.    Advance Care Planning Current  advance care plan has not been discussed with patient/family/POA and patient currently wishes Full Code.     BROOK (acute kidney injury)  BROOK is likely due to pre-renal azotemia due to dehydration vs med vs less likely cardiorenal. Baseline creatinine is  0.9-1 . Most recent creatinine and eGFR are listed below.  Recent Labs     01/02/25  1415 01/03/25  0445   CREATININE 1.3 1.6*   EGFRNORACEVR 57.3* 44.7*      Plan  - BROOK is worsening. Will d/c IV lasix as appears dehydrated. Got one dose of IV lasix on admission per H&P. Encourage PO intake. Will consider fluids if worsening despite appropriate PO intake   - Avoid nephrotoxins and renally dose meds for GFR listed above  - Monitor urine output, serial BMP, and adjust therapy as needed  - Follow renal lytes    Elevated troponin likely iso Demand  CAD  HLD  - CXR unremarkable  - EKG pending  - troponin 62 and 71 - delta not significant and no ACS signs -will continue to monitor and trend further if any concern for ACS  - Continue ASA, statin, plavix  - Cardiac monitoring  - PRN EKG and sl nitro for chest pain  - K>4, Mg>2  - ECHO unremarkable as above      Elevated brain natriuretic peptide (BNP) level  - BNP >1000 on admission with mild bump in Troponin without significant delta - possibly 2/2 demand related to COVID infection  - CXR unremarkable  - given lasix 40mg IV x 1 in the ED with bump in creatinine  - hold off on further lasix at this time  - Encourage PO intake  - ECHO 1/3/2025 with normal systolic and diastolic dysfunction. 55 - 60%.IVC 3 mmHg  - d/c fluid restriction  - strict input/output      HTN (hypertension)  Patient's blood pressure range in the last 24 hours was: BP  Min: 105/51  Max: 174/75.The patient's inpatient anti-hypertensive regimen is listed below:  Current Antihypertensives       Plan  - BP is controlled, no changes needed to their regimen  - Hold home Losartan/HCTZ due to BROOK and dehydration  - Holding BB for now - restart as able  -  tele    Diabetes mellitus  Patient's FSGs are uncontrolled due to hyperglycemia on current medication regimen.  Last A1c reviewed-   Lab Results   Component Value Date    HGBA1C 6.9 (H) 01/03/2025     Most recent fingerstick glucose reviewed-   Recent Labs   Lab 01/02/25  1559 01/03/25  0833   POCTGLUCOSE 175* 152*     Current correctional scale  Low  Maintain anti-hyperglycemic dose as follows-   Antihyperglycemics (From admission, onward)      Start     Stop Route Frequency Ordered    01/03/25 0900  insulin glargine U-100 (Lantus) pen 5 Units         -- SubQ Daily 01/02/25 1538    01/02/25 1637  insulin aspart U-100 pen 0-5 Units         -- SubQ Before meals & nightly PRN 01/02/25 1538          Hold Oral hypoglycemics while patient is in the hospital.    Arteriosclerosis of coronary artery  Patient with known CAD which is controlled Will continue ASA, Plavix, and Statin and monitor for S/Sx of angina/ACS. Continue to monitor on telemetry.       VTE Risk Mitigation (From admission, onward)           Ordered     enoxaparin injection 50 mg  2 times daily         01/02/25 1530     IP VTE HIGH RISK PATIENT  Once         01/02/25 1528     Place sequential compression device  Until discontinued         01/02/25 1528                    Discharge Planning   HBAVIK:      Code Status: Full Code   Medical Readiness for Discharge Date:                            Fran Stinson MD  Department of Hospital Medicine   [facility] Dept

## 2025-01-04 PROBLEM — R79.89 ELEVATED TROPONIN: Status: RESOLVED | Noted: 2025-01-02 | Resolved: 2025-01-04

## 2025-01-04 LAB
ANION GAP SERPL CALC-SCNC: 12 MMOL/L (ref 8–16)
BASOPHILS # BLD AUTO: 0.02 K/UL (ref 0–0.2)
BASOPHILS NFR BLD: 0.2 % (ref 0–1.9)
BUN SERPL-MCNC: 53 MG/DL (ref 8–23)
CALCIUM SERPL-MCNC: 8.3 MG/DL (ref 8.7–10.5)
CHLORIDE SERPL-SCNC: 101 MMOL/L (ref 95–110)
CO2 SERPL-SCNC: 23 MMOL/L (ref 23–29)
CREAT SERPL-MCNC: 1.4 MG/DL (ref 0.5–1.4)
DIFFERENTIAL METHOD BLD: ABNORMAL
EOSINOPHIL # BLD AUTO: 0 K/UL (ref 0–0.5)
EOSINOPHIL NFR BLD: 0.1 % (ref 0–8)
ERYTHROCYTE [DISTWIDTH] IN BLOOD BY AUTOMATED COUNT: 13.2 % (ref 11.5–14.5)
EST. GFR  (NO RACE VARIABLE): 52.4 ML/MIN/1.73 M^2
GLUCOSE SERPL-MCNC: 131 MG/DL (ref 70–110)
HCT VFR BLD AUTO: 43.9 % (ref 40–54)
HGB BLD-MCNC: 14.6 G/DL (ref 14–18)
IMM GRANULOCYTES # BLD AUTO: 0.04 K/UL (ref 0–0.04)
IMM GRANULOCYTES NFR BLD AUTO: 0.5 % (ref 0–0.5)
LYMPHOCYTES # BLD AUTO: 1.9 K/UL (ref 1–4.8)
LYMPHOCYTES NFR BLD: 24 % (ref 18–48)
MAGNESIUM SERPL-MCNC: 2.5 MG/DL (ref 1.6–2.6)
MCH RBC QN AUTO: 30.3 PG (ref 27–31)
MCHC RBC AUTO-ENTMCNC: 33.3 G/DL (ref 32–36)
MCV RBC AUTO: 91 FL (ref 82–98)
MONOCYTES # BLD AUTO: 0.6 K/UL (ref 0.3–1)
MONOCYTES NFR BLD: 7.8 % (ref 4–15)
NEUTROPHILS # BLD AUTO: 5.4 K/UL (ref 1.8–7.7)
NEUTROPHILS NFR BLD: 67.4 % (ref 38–73)
NRBC BLD-RTO: 0 /100 WBC
PLATELET # BLD AUTO: 243 K/UL (ref 150–450)
PMV BLD AUTO: 8.9 FL (ref 9.2–12.9)
POCT GLUCOSE: 144 MG/DL (ref 70–110)
POCT GLUCOSE: 224 MG/DL (ref 70–110)
POCT GLUCOSE: 226 MG/DL (ref 70–110)
POCT GLUCOSE: 363 MG/DL (ref 70–110)
POTASSIUM SERPL-SCNC: 3.9 MMOL/L (ref 3.5–5.1)
RBC # BLD AUTO: 4.82 M/UL (ref 4.6–6.2)
SODIUM SERPL-SCNC: 136 MMOL/L (ref 136–145)
WBC # BLD AUTO: 8.04 K/UL (ref 3.9–12.7)

## 2025-01-04 PROCEDURE — 80048 BASIC METABOLIC PNL TOTAL CA: CPT | Performed by: PHYSICIAN ASSISTANT

## 2025-01-04 PROCEDURE — 85025 COMPLETE CBC W/AUTO DIFF WBC: CPT | Performed by: PHYSICIAN ASSISTANT

## 2025-01-04 PROCEDURE — 63600175 PHARM REV CODE 636 W HCPCS: Performed by: STUDENT IN AN ORGANIZED HEALTH CARE EDUCATION/TRAINING PROGRAM

## 2025-01-04 PROCEDURE — 27000221 HC OXYGEN, UP TO 24 HOURS

## 2025-01-04 PROCEDURE — 36415 COLL VENOUS BLD VENIPUNCTURE: CPT | Performed by: PHYSICIAN ASSISTANT

## 2025-01-04 PROCEDURE — 27000207 HC ISOLATION

## 2025-01-04 PROCEDURE — 94761 N-INVAS EAR/PLS OXIMETRY MLT: CPT

## 2025-01-04 PROCEDURE — 25000003 PHARM REV CODE 250: Performed by: PHYSICIAN ASSISTANT

## 2025-01-04 PROCEDURE — 25000003 PHARM REV CODE 250: Performed by: STUDENT IN AN ORGANIZED HEALTH CARE EDUCATION/TRAINING PROGRAM

## 2025-01-04 PROCEDURE — 83735 ASSAY OF MAGNESIUM: CPT | Performed by: PHYSICIAN ASSISTANT

## 2025-01-04 PROCEDURE — 21400001 HC TELEMETRY ROOM

## 2025-01-04 PROCEDURE — 63600175 PHARM REV CODE 636 W HCPCS: Mod: JZ,TB | Performed by: PHYSICIAN ASSISTANT

## 2025-01-04 PROCEDURE — 94799 UNLISTED PULMONARY SVC/PX: CPT

## 2025-01-04 RX ADMIN — Medication 500 MG: at 09:01

## 2025-01-04 RX ADMIN — ENOXAPARIN SODIUM 50 MG: 60 INJECTION SUBCUTANEOUS at 09:01

## 2025-01-04 RX ADMIN — INSULIN GLARGINE 5 UNITS: 100 INJECTION, SOLUTION SUBCUTANEOUS at 09:01

## 2025-01-04 RX ADMIN — ASPIRIN 81 MG: 81 TABLET, COATED ORAL at 09:01

## 2025-01-04 RX ADMIN — REMDESIVIR 100 MG: 100 INJECTION, POWDER, LYOPHILIZED, FOR SOLUTION INTRAVENOUS at 09:01

## 2025-01-04 RX ADMIN — ATORVASTATIN CALCIUM 80 MG: 40 TABLET, FILM COATED ORAL at 09:01

## 2025-01-04 RX ADMIN — DEXAMETHASONE 6 MG: 4 TABLET ORAL at 09:01

## 2025-01-04 RX ADMIN — INSULIN ASPART 5 UNITS: 100 INJECTION, SOLUTION INTRAVENOUS; SUBCUTANEOUS at 05:01

## 2025-01-04 RX ADMIN — THERA TABS 1 TABLET: TAB at 09:01

## 2025-01-04 RX ADMIN — Medication 100 MG: at 09:01

## 2025-01-04 RX ADMIN — PANTOPRAZOLE SODIUM 40 MG: 40 TABLET, DELAYED RELEASE ORAL at 09:01

## 2025-01-04 RX ADMIN — GABAPENTIN 300 MG: 300 CAPSULE ORAL at 09:01

## 2025-01-04 RX ADMIN — CLOPIDOGREL BISULFATE 75 MG: 75 TABLET ORAL at 09:01

## 2025-01-04 NOTE — ASSESSMENT & PLAN NOTE
Patient's blood pressure range in the last 24 hours was: BP  Min: 125/60  Max: 151/68.The patient's inpatient anti-hypertensive regimen is listed below:  Current Antihypertensives       Plan  - BP is controlled, no changes needed to their regimen  - Hold home Losartan/HCTZ due to BROOK and dehydration  - Holding BB for now - restart as able  - tele

## 2025-01-04 NOTE — CONSULTS
Diet education inappropriate at this time. RD will follow up.     Diet Education: Fluid and Salt restriction     Handouts provided: Low Sodium Nutrition Therapy, Fluid-Restricted Nutrition Therapy (in discharge paperwork)

## 2025-01-04 NOTE — SUBJECTIVE & OBJECTIVE
Interval History: No acute events overnight. Pt remains on supplemental O2 via     Review of Systems   Constitutional:  Positive for fatigue. Negative for chills and fever.   HENT:  Negative for congestion and sinus pain.    Respiratory:  Positive for cough and shortness of breath. Negative for chest tightness.    Cardiovascular:  Negative for leg swelling.   Gastrointestinal:  Negative for abdominal pain, constipation, diarrhea, nausea and vomiting.   Genitourinary:  Negative for difficulty urinating.   Musculoskeletal:  Negative for arthralgias, back pain, joint swelling and neck pain.   Skin:  Negative for pallor, rash and wound.   Neurological:  Negative for dizziness and light-headedness.   Psychiatric/Behavioral:  Negative for agitation and behavioral problems.      Objective:     Vital Signs (Most Recent):  Temp: 98.9 °F (37.2 °C) (01/04/25 1211)  Pulse: 79 (01/04/25 1211)  Resp: (!) 24 (01/04/25 1211)  BP: (!) 140/63 (01/04/25 1211)  SpO2: 97 % (01/04/25 1211) Vital Signs (24h Range):  Temp:  [97.7 °F (36.5 °C)-98.9 °F (37.2 °C)] 98.9 °F (37.2 °C)  Pulse:  [70-84] 79  Resp:  [16-24] 24  SpO2:  [95 %-99 %] 97 %  BP: (125-151)/(60-68) 140/63     Weight: 50 kg (110 lb 3.7 oz)  Body mass index is 17.79 kg/m².    Intake/Output Summary (Last 24 hours) at 1/4/2025 1226  Last data filed at 1/4/2025 0613  Gross per 24 hour   Intake 240 ml   Output 500 ml   Net -260 ml         Physical Exam  Vitals and nursing note reviewed.   Constitutional:       General: He is not in acute distress.     Appearance: Normal appearance.   HENT:      Head: Normocephalic and atraumatic.      Nose: Nose normal.      Mouth/Throat:      Mouth: Mucous membranes are moist.   Eyes:      General: No scleral icterus.     Extraocular Movements: Extraocular movements intact.      Conjunctiva/sclera: Conjunctivae normal.   Cardiovascular:      Rate and Rhythm: Normal rate and regular rhythm.      Pulses: Normal pulses.      Heart sounds: No murmur  heard.     No friction rub. No gallop.   Pulmonary:      Effort: Pulmonary effort is normal. No respiratory distress.      Breath sounds: Rhonchi present.   Abdominal:      Palpations: Abdomen is soft.      Tenderness: There is no abdominal tenderness. There is no guarding or rebound.   Musculoskeletal:         General: Normal range of motion.      Cervical back: Normal range of motion and neck supple. No rigidity or tenderness.   Skin:     General: Skin is warm and dry.      Capillary Refill: Capillary refill takes less than 2 seconds.   Neurological:      General: No focal deficit present.      Mental Status: He is alert.   Psychiatric:         Mood and Affect: Mood normal.             Significant Labs: All pertinent labs within the past 24 hours have been reviewed.    Significant Imaging: I have reviewed all pertinent imaging results/findings within the past 24 hours.

## 2025-01-04 NOTE — ASSESSMENT & PLAN NOTE
Patient's FSGs are uncontrolled due to hyperglycemia on current medication regimen.  Last A1c reviewed-   Lab Results   Component Value Date    HGBA1C 6.9 (H) 01/03/2025     Most recent fingerstick glucose reviewed-   Recent Labs   Lab 01/04/25  0746 01/04/25  1205   POCTGLUCOSE 144* 226*       Current correctional scale  Low  Maintain anti-hyperglycemic dose as follows-   Antihyperglycemics (From admission, onward)    Start     Stop Route Frequency Ordered    01/03/25 0900  insulin glargine U-100 (Lantus) pen 5 Units         -- SubQ Daily 01/02/25 1538    01/02/25 1637  insulin aspart U-100 pen 0-5 Units         -- SubQ Before meals & nightly PRN 01/02/25 1538        Hold Oral hypoglycemics while patient is in the hospital.

## 2025-01-04 NOTE — ASSESSMENT & PLAN NOTE
BROOK is likely due to pre-renal azotemia due to dehydration vs med vs less likely cardiorenal. Baseline creatinine is  0.9-1 . Most recent creatinine and eGFR are listed below.  Recent Labs     01/02/25  1415 01/03/25  0445 01/04/25  0712   CREATININE 1.3 1.6* 1.4   EGFRNORACEVR 57.3* 44.7* 52.4*        Plan  - BROOK is improving  - Avoid nephrotoxins and renally dose meds for GFR listed above  - Monitor urine output, serial BMP, and adjust therapy as needed  - Follow renal lytes

## 2025-01-05 PROBLEM — D64.9 ANEMIA: Status: ACTIVE | Noted: 2025-01-05

## 2025-01-05 LAB
ANION GAP SERPL CALC-SCNC: 10 MMOL/L (ref 8–16)
BASOPHILS # BLD AUTO: 0.01 K/UL (ref 0–0.2)
BASOPHILS NFR BLD: 0.1 % (ref 0–1.9)
BUN SERPL-MCNC: 45 MG/DL (ref 8–23)
CALCIUM SERPL-MCNC: 8.5 MG/DL (ref 8.7–10.5)
CHLORIDE SERPL-SCNC: 104 MMOL/L (ref 95–110)
CO2 SERPL-SCNC: 24 MMOL/L (ref 23–29)
CREAT SERPL-MCNC: 1.2 MG/DL (ref 0.5–1.4)
DIFFERENTIAL METHOD BLD: ABNORMAL
EOSINOPHIL # BLD AUTO: 0 K/UL (ref 0–0.5)
EOSINOPHIL NFR BLD: 0 % (ref 0–8)
ERYTHROCYTE [DISTWIDTH] IN BLOOD BY AUTOMATED COUNT: 13.1 % (ref 11.5–14.5)
EST. GFR  (NO RACE VARIABLE): >60 ML/MIN/1.73 M^2
GLUCOSE SERPL-MCNC: 143 MG/DL (ref 70–110)
HCT VFR BLD AUTO: 42.5 % (ref 40–54)
HGB BLD-MCNC: 13.7 G/DL (ref 14–18)
IMM GRANULOCYTES # BLD AUTO: 0.02 K/UL (ref 0–0.04)
IMM GRANULOCYTES NFR BLD AUTO: 0.3 % (ref 0–0.5)
LYMPHOCYTES # BLD AUTO: 1.3 K/UL (ref 1–4.8)
LYMPHOCYTES NFR BLD: 18.8 % (ref 18–48)
MAGNESIUM SERPL-MCNC: 2.6 MG/DL (ref 1.6–2.6)
MCH RBC QN AUTO: 30.1 PG (ref 27–31)
MCHC RBC AUTO-ENTMCNC: 32.2 G/DL (ref 32–36)
MCV RBC AUTO: 93 FL (ref 82–98)
MONOCYTES # BLD AUTO: 0.6 K/UL (ref 0.3–1)
MONOCYTES NFR BLD: 9.2 % (ref 4–15)
NEUTROPHILS # BLD AUTO: 4.9 K/UL (ref 1.8–7.7)
NEUTROPHILS NFR BLD: 71.6 % (ref 38–73)
NRBC BLD-RTO: 0 /100 WBC
PLATELET # BLD AUTO: 262 K/UL (ref 150–450)
PMV BLD AUTO: 9.1 FL (ref 9.2–12.9)
POCT GLUCOSE: 154 MG/DL (ref 70–110)
POCT GLUCOSE: 250 MG/DL (ref 70–110)
POCT GLUCOSE: 250 MG/DL (ref 70–110)
POCT GLUCOSE: 300 MG/DL (ref 70–110)
POTASSIUM SERPL-SCNC: 4.2 MMOL/L (ref 3.5–5.1)
RBC # BLD AUTO: 4.55 M/UL (ref 4.6–6.2)
SODIUM SERPL-SCNC: 138 MMOL/L (ref 136–145)
WBC # BLD AUTO: 6.82 K/UL (ref 3.9–12.7)

## 2025-01-05 PROCEDURE — 83735 ASSAY OF MAGNESIUM: CPT | Performed by: PHYSICIAN ASSISTANT

## 2025-01-05 PROCEDURE — 25000003 PHARM REV CODE 250: Performed by: STUDENT IN AN ORGANIZED HEALTH CARE EDUCATION/TRAINING PROGRAM

## 2025-01-05 PROCEDURE — 27000207 HC ISOLATION

## 2025-01-05 PROCEDURE — 63600175 PHARM REV CODE 636 W HCPCS: Performed by: INTERNAL MEDICINE

## 2025-01-05 PROCEDURE — 85025 COMPLETE CBC W/AUTO DIFF WBC: CPT | Performed by: PHYSICIAN ASSISTANT

## 2025-01-05 PROCEDURE — 25000242 PHARM REV CODE 250 ALT 637 W/ HCPCS: Performed by: INTERNAL MEDICINE

## 2025-01-05 PROCEDURE — 36415 COLL VENOUS BLD VENIPUNCTURE: CPT | Performed by: PHYSICIAN ASSISTANT

## 2025-01-05 PROCEDURE — 25000003 PHARM REV CODE 250: Performed by: PHYSICIAN ASSISTANT

## 2025-01-05 PROCEDURE — 80048 BASIC METABOLIC PNL TOTAL CA: CPT | Performed by: PHYSICIAN ASSISTANT

## 2025-01-05 PROCEDURE — 21400001 HC TELEMETRY ROOM

## 2025-01-05 PROCEDURE — 63600175 PHARM REV CODE 636 W HCPCS: Performed by: PHYSICIAN ASSISTANT

## 2025-01-05 RX ORDER — ENOXAPARIN SODIUM 100 MG/ML
1 INJECTION SUBCUTANEOUS EVERY 12 HOURS
Status: DISCONTINUED | OUTPATIENT
Start: 2025-01-05 | End: 2025-01-07 | Stop reason: HOSPADM

## 2025-01-05 RX ORDER — FLUTICASONE PROPIONATE 50 MCG
2 SPRAY, SUSPENSION (ML) NASAL DAILY
Status: DISCONTINUED | OUTPATIENT
Start: 2025-01-05 | End: 2025-01-07 | Stop reason: HOSPADM

## 2025-01-05 RX ADMIN — DEXAMETHASONE 6 MG: 4 TABLET ORAL at 09:01

## 2025-01-05 RX ADMIN — Medication 100 MG: at 09:01

## 2025-01-05 RX ADMIN — GABAPENTIN 300 MG: 300 CAPSULE ORAL at 08:01

## 2025-01-05 RX ADMIN — GABAPENTIN 300 MG: 300 CAPSULE ORAL at 09:01

## 2025-01-05 RX ADMIN — ATORVASTATIN CALCIUM 80 MG: 40 TABLET, FILM COATED ORAL at 08:01

## 2025-01-05 RX ADMIN — INSULIN ASPART 1 UNITS: 100 INJECTION, SOLUTION INTRAVENOUS; SUBCUTANEOUS at 10:01

## 2025-01-05 RX ADMIN — PANTOPRAZOLE SODIUM 40 MG: 40 TABLET, DELAYED RELEASE ORAL at 09:01

## 2025-01-05 RX ADMIN — INSULIN ASPART 2 UNITS: 100 INJECTION, SOLUTION INTRAVENOUS; SUBCUTANEOUS at 02:01

## 2025-01-05 RX ADMIN — ENOXAPARIN SODIUM 50 MG: 60 INJECTION SUBCUTANEOUS at 08:01

## 2025-01-05 RX ADMIN — Medication 500 MG: at 08:01

## 2025-01-05 RX ADMIN — THERA TABS 1 TABLET: TAB at 09:01

## 2025-01-05 RX ADMIN — Medication 500 MG: at 09:01

## 2025-01-05 RX ADMIN — INSULIN GLARGINE 5 UNITS: 100 INJECTION, SOLUTION SUBCUTANEOUS at 09:01

## 2025-01-05 RX ADMIN — REMDESIVIR 100 MG: 100 INJECTION, POWDER, LYOPHILIZED, FOR SOLUTION INTRAVENOUS at 09:01

## 2025-01-05 RX ADMIN — ASPIRIN 81 MG: 81 TABLET, COATED ORAL at 09:01

## 2025-01-05 RX ADMIN — CLOPIDOGREL BISULFATE 75 MG: 75 TABLET ORAL at 09:01

## 2025-01-05 RX ADMIN — INSULIN ASPART 3 UNITS: 100 INJECTION, SOLUTION INTRAVENOUS; SUBCUTANEOUS at 05:01

## 2025-01-05 RX ADMIN — FLUTICASONE PROPIONATE 100 MCG: 50 SPRAY, METERED NASAL at 05:01

## 2025-01-05 RX ADMIN — GUAIFENESIN AND DEXTROMETHORPHAN 10 ML: 100; 10 SYRUP ORAL at 08:01

## 2025-01-05 RX ADMIN — GUAIFENESIN AND DEXTROMETHORPHAN 10 ML: 100; 10 SYRUP ORAL at 02:01

## 2025-01-05 NOTE — ASSESSMENT & PLAN NOTE
Patient's blood pressure range in the last 24 hours was: BP  Min: 113/58  Max: 151/68.The patient's inpatient anti-hypertensive regimen is listed below:  Current Antihypertensives       Plan  - BP is controlled, no changes needed to their regimen  - Hold home Losartan/HCTZ due to BROOK and dehydration  - Holding BB for now - restart as able  - tele

## 2025-01-05 NOTE — ASSESSMENT & PLAN NOTE
Anemia is likely due to acute blood loss which was from blood draws . Most recent hemoglobin and hematocrit are listed below.  Recent Labs     01/03/25  0340 01/04/25  0712 01/05/25  0605   HGB 14.8 14.6 13.7*   HCT 45.7 43.9 42.5     Plan  - Monitor serial CBC: Daily  - Transfuse PRBC if patient becomes hemodynamically unstable, symptomatic or H/H drops below 7/21.  - Patient has not received any PRBC transfusions to date  - Patient's anemia is currently worsening. Will continue current treatment Avoid labs unless acutely necessary.

## 2025-01-05 NOTE — ASSESSMENT & PLAN NOTE
Patient's FSGs are uncontrolled due to hyperglycemia on current medication regimen.  Last A1c reviewed-   Lab Results   Component Value Date    HGBA1C 6.9 (H) 01/03/2025     Most recent fingerstick glucose reviewed-   Recent Labs   Lab 01/04/25  1621 01/04/25  2331 01/05/25  0811 01/05/25  1213   POCTGLUCOSE 363* 224* 154* 250*       Current correctional scale  Low  Maintain anti-hyperglycemic dose as follows-   Antihyperglycemics (From admission, onward)    Start     Stop Route Frequency Ordered    01/03/25 0900  insulin glargine U-100 (Lantus) pen 5 Units         -- SubQ Daily 01/02/25 1538    01/02/25 1637  insulin aspart U-100 pen 0-5 Units         -- SubQ Before meals & nightly PRN 01/02/25 1538        Hold Oral hypoglycemics while patient is in the hospital.

## 2025-01-05 NOTE — PROGRESS NOTES
Pharmacist Renal Dose Adjustment Note    Kishan Dow is a 74 y.o. male being treated with the medication enoxaparin    Patient Data:    Vital Signs (Most Recent):  Temp: 97.6 °F (36.4 °C) (01/05/25 1216)  Pulse: 70 (01/05/25 1216)  Resp: 20 (01/05/25 1216)  BP: (!) 148/68 (01/05/25 1216)  SpO2: 98 % (01/05/25 1216) Vital Signs (72h Range):  Temp:  [97.6 °F (36.4 °C)-100.1 °F (37.8 °C)]   Pulse:  [62-86]   Resp:  [16-24]   BP: (105-174)/(51-75)   SpO2:  [92 %-100 %]      Recent Labs   Lab 01/03/25  0445 01/04/25  0712 01/05/25  0605   CREATININE 1.6* 1.4 1.2     Serum creatinine: 1.2 mg/dL 01/05/25 0605  Estimated creatinine clearance: 37.6 mL/min    Enoxaparin 1 mg/kg (50 mg) SubQ Q 24 hours will be changed to enoxaparin 1 mg/kg (50 mg) SubQ Q 12 hours.     Pharmacist's Name: Mary Juarez, PharmD  Pharmacist's Extension: [phone]

## 2025-01-05 NOTE — ASSESSMENT & PLAN NOTE
BROOK is likely due to pre-renal azotemia due to dehydration vs med vs less likely cardiorenal. Baseline creatinine is  0.9-1 . Most recent creatinine and eGFR are listed below.  Recent Labs     01/03/25  0445 01/04/25  0712 01/05/25  0605   CREATININE 1.6* 1.4 1.2   EGFRNORACEVR 44.7* 52.4* >60.0        Plan  - BROOK is improving  - Avoid nephrotoxins and renally dose meds for GFR listed above  - Monitor urine output, serial BMP, and adjust therapy as needed  - Follow renal lytes

## 2025-01-05 NOTE — PROGRESS NOTES
[facility]  Progress Note    Patient Name: Kishan Dow  MRN: [id number]  Patient Class: IP- Inpatient   Admission Date: 1/2/2025  Length of Stay: 3 days  Attending Physician: Irvin Watkins MD  Primary Care Provider: Michelle, Primary Doctor        Subjective     Principal Problem:COVID-19        HPI:  Kishan Dow is a 74 y.o. male with PMHx significant for DM II, HTN, HLD, CAD admitted to hospital medicine for volume overload and Covid 19. Patient reports B thoracic back pain that is worse with deep breaths and associated productive cough, SOB, sore throat, and fatigue for the past few days. Patient's daughter reports that at baseline, patient is ambulatory and lives with her. She noted that today he appeared weak, and was having difficulty ambulating independently which prompted her to call EMS. Denies fever/chills, diaphoresis, lightheadedness, HA, abdominal pain, n/v/d, urinary symptoms, changes in BMs, numbness/tingling, weakness.     In the ED, afebrile with HR 72, RR 18, /70. SpO2 92% on RA. WBC WNL. Na 134. Cr 1.3. Glucose 171. T bili 1.4. BNP 1068 (no Hx of CHF). HS Trop 62. Covid 19 +. CXR with atherosclerotic calcification present within the thoracic aorta. Pulmonary vasculature is within normal limits. The lungs are free of focal consolidations. There is no evidence for pneumothorax or large pleural effusions. Given duo neb x1, tylenol x1, lasix 40 mg x 1, and dexamethasone x1.     Overview/Hospital Course:  No notes on file    Interval History: No acute events overnight. Pt reports doing well. Denies worsening dyspnea or chest pain. No acute care concerns.     Daughter, Katherine, ann.     Review of Systems   Constitutional:  Positive for fatigue. Negative for chills and fever.   HENT:  Negative for congestion, facial swelling, sinus pain and tinnitus.    Eyes:  Negative for pain and visual disturbance.   Respiratory:  Positive for cough and shortness of breath.  Negative for choking and wheezing.    Cardiovascular:  Negative for chest pain.   Gastrointestinal:  Negative for abdominal pain, constipation, diarrhea, nausea and vomiting.   Genitourinary:  Negative for difficulty urinating.   Musculoskeletal:  Negative for arthralgias, back pain and joint swelling.   Skin:  Negative for pallor, rash and wound.   Neurological:  Positive for weakness. Negative for dizziness and light-headedness.   Psychiatric/Behavioral:  Negative for agitation and behavioral problems.      Objective:     Vital Signs (Most Recent):  Temp: 97.6 °F (36.4 °C) (01/05/25 1216)  Pulse: 70 (01/05/25 1216)  Resp: 20 (01/05/25 1216)  BP: (!) 148/68 (01/05/25 1216)  SpO2: 98 % (01/05/25 1216) Vital Signs (24h Range):  Temp:  [97.6 °F (36.4 °C)-98.3 °F (36.8 °C)] 97.6 °F (36.4 °C)  Pulse:  [63-79] 70  Resp:  [20] 20  SpO2:  [95 %-100 %] 98 %  BP: (113-151)/(56-68) 148/68     Weight: 50 kg (110 lb 3.7 oz)  Body mass index is 17.79 kg/m².    Intake/Output Summary (Last 24 hours) at 1/5/2025 1302  Last data filed at 1/5/2025 1009  Gross per 24 hour   Intake --   Output 1175 ml   Net -1175 ml         Physical Exam  Vitals and nursing note reviewed.   Constitutional:       General: He is not in acute distress.  HENT:      Head: Normocephalic and atraumatic.      Nose: Nose normal.      Mouth/Throat:      Mouth: Mucous membranes are moist.   Eyes:      General: No scleral icterus.     Extraocular Movements: Extraocular movements intact.      Conjunctiva/sclera: Conjunctivae normal.   Cardiovascular:      Rate and Rhythm: Normal rate and regular rhythm.      Pulses: Normal pulses.      Heart sounds: No murmur heard.     No friction rub. No gallop.   Pulmonary:      Effort: Pulmonary effort is normal. No respiratory distress.      Breath sounds: Rhonchi present.   Abdominal:      General: There is no distension.      Palpations: Abdomen is soft.      Tenderness: There is no abdominal tenderness. There is no guarding  or rebound.   Musculoskeletal:         General: Normal range of motion.      Cervical back: Neck supple. No tenderness.   Skin:     General: Skin is warm and dry.      Capillary Refill: Capillary refill takes less than 2 seconds.   Neurological:      General: No focal deficit present.      Mental Status: He is alert.   Psychiatric:         Mood and Affect: Mood normal.             Significant Labs: All pertinent labs within the past 24 hours have been reviewed.    Significant Imaging: I have reviewed all pertinent imaging results/findings within the past 24 hours.    Assessment and Plan     * COVID-19  Patient is identified as Severe COVID-19 based on hypoxemia with O2 saturations <94% on room air or on ambulation   Initiate standard COVID protocols; COVID-19 testing ,Infection Control notification  and isolation- respiratory, contact and droplet per protocol    Diagnostics: CBC, CMP, CRP, BNP, Troponin, and Portable CXR    Management: Initiate targeted therapy with Remdesivir, 200mg IV x1, followed by 100mg IV daily x5 days total, Dexamethasone PO/IV 6mg daily x10 days, and Anticoagulation, Patient admitted to non-critical care unit- Will initiate full dose anticoagulation with Weight based lovenox 1mg/kg IV q12, Inhaled bronchodilators as needed for shortness of breath., and Continuous cardiac monitoring.    Advance Care Planning Current advance care plan has not been discussed with patient/family/POA and patient currently wishes Full Code.     Anemia  Anemia is likely due to acute blood loss which was from blood draws . Most recent hemoglobin and hematocrit are listed below.  Recent Labs     01/03/25  0340 01/04/25  0712 01/05/25  0605   HGB 14.8 14.6 13.7*   HCT 45.7 43.9 42.5     Plan  - Monitor serial CBC: Daily  - Transfuse PRBC if patient becomes hemodynamically unstable, symptomatic or H/H drops below 7/21.  - Patient has not received any PRBC transfusions to date  - Patient's anemia is currently worsening.  Will continue current treatment Avoid labs unless acutely necessary.       BROOK (acute kidney injury)  BROOK is likely due to pre-renal azotemia due to dehydration vs med vs less likely cardiorenal. Baseline creatinine is  0.9-1 . Most recent creatinine and eGFR are listed below.  Recent Labs     01/03/25  0445 01/04/25  0712 01/05/25  0605   CREATININE 1.6* 1.4 1.2   EGFRNORACEVR 44.7* 52.4* >60.0        Plan  - BROOK is improving  - Avoid nephrotoxins and renally dose meds for GFR listed above  - Monitor urine output, serial BMP, and adjust therapy as needed  - Follow renal lytes    Elevated brain natriuretic peptide (BNP) level  - BNP >1000 on admission with mild bump in Troponin without significant delta - possibly 2/2 demand related to COVID infection  - CXR unremarkable  - given lasix 40mg IV x 1 in the ED with bump in creatinine  - hold off on further lasix at this time  - Encourage PO intake  - ECHO 1/3/2025 with normal systolic and diastolic dysfunction. 55 - 60%.IVC 3 mmHg  - d/c fluid restriction  - strict input/output      HTN (hypertension)  Patient's blood pressure range in the last 24 hours was: BP  Min: 113/58  Max: 151/68.The patient's inpatient anti-hypertensive regimen is listed below:  Current Antihypertensives       Plan  - BP is controlled, no changes needed to their regimen  - Hold home Losartan/HCTZ due to BROOK and dehydration  - Holding BB for now - restart as able  - tele    Hyperlipidemia  Continue Atorvastatin. No side effects noted.     Diabetes mellitus  Patient's FSGs are uncontrolled due to hyperglycemia on current medication regimen.  Last A1c reviewed-   Lab Results   Component Value Date    HGBA1C 6.9 (H) 01/03/2025     Most recent fingerstick glucose reviewed-   Recent Labs   Lab 01/04/25  1621 01/04/25  2331 01/05/25  0811 01/05/25  1213   POCTGLUCOSE 363* 224* 154* 250*       Current correctional scale  Low  Maintain anti-hyperglycemic dose as follows-   Antihyperglycemics (From  admission, onward)      Start     Stop Route Frequency Ordered    01/03/25 0900  insulin glargine U-100 (Lantus) pen 5 Units         -- SubQ Daily 01/02/25 1538    01/02/25 1637  insulin aspart U-100 pen 0-5 Units         -- SubQ Before meals & nightly PRN 01/02/25 1538          Hold Oral hypoglycemics while patient is in the hospital.    Arteriosclerosis of coronary artery  Patient with known CAD which is controlled Will continue ASA, Plavix, and Statin and monitor for S/Sx of angina/ACS. Continue to monitor on telemetry.       VTE Risk Mitigation (From admission, onward)           Ordered     enoxaparin injection 50 mg  Every 24 hours         01/03/25 1506     IP VTE HIGH RISK PATIENT  Once         01/02/25 1528     Place sequential compression device  Until discontinued         01/02/25 1528                    Discharge Planning   BHAVIK: 1/7/2025     Code Status: Full Code   Medical Readiness for Discharge Date:   Discharge Plan A: Home                        Irvin Watkins MD  Department of Hospital Medicine   [facility]

## 2025-01-05 NOTE — SUBJECTIVE & OBJECTIVE
Interval History: No acute events overnight. Pt reports doing well. Denies worsening dyspnea or chest pain. No acute care concerns.     Daughter, Katherine, updated.     Review of Systems   Constitutional:  Positive for fatigue. Negative for chills and fever.   HENT:  Negative for congestion, facial swelling, sinus pain and tinnitus.    Eyes:  Negative for pain and visual disturbance.   Respiratory:  Positive for cough and shortness of breath. Negative for choking and wheezing.    Cardiovascular:  Negative for chest pain.   Gastrointestinal:  Negative for abdominal pain, constipation, diarrhea, nausea and vomiting.   Genitourinary:  Negative for difficulty urinating.   Musculoskeletal:  Negative for arthralgias, back pain and joint swelling.   Skin:  Negative for pallor, rash and wound.   Neurological:  Positive for weakness. Negative for dizziness and light-headedness.   Psychiatric/Behavioral:  Negative for agitation and behavioral problems.      Objective:     Vital Signs (Most Recent):  Temp: 97.6 °F (36.4 °C) (01/05/25 1216)  Pulse: 70 (01/05/25 1216)  Resp: 20 (01/05/25 1216)  BP: (!) 148/68 (01/05/25 1216)  SpO2: 98 % (01/05/25 1216) Vital Signs (24h Range):  Temp:  [97.6 °F (36.4 °C)-98.3 °F (36.8 °C)] 97.6 °F (36.4 °C)  Pulse:  [63-79] 70  Resp:  [20] 20  SpO2:  [95 %-100 %] 98 %  BP: (113-151)/(56-68) 148/68     Weight: 50 kg (110 lb 3.7 oz)  Body mass index is 17.79 kg/m².    Intake/Output Summary (Last 24 hours) at 1/5/2025 1302  Last data filed at 1/5/2025 1009  Gross per 24 hour   Intake --   Output 1175 ml   Net -1175 ml         Physical Exam  Vitals and nursing note reviewed.   Constitutional:       General: He is not in acute distress.  HENT:      Head: Normocephalic and atraumatic.      Nose: Nose normal.      Mouth/Throat:      Mouth: Mucous membranes are moist.   Eyes:      General: No scleral icterus.     Extraocular Movements: Extraocular movements intact.      Conjunctiva/sclera: Conjunctivae  normal.   Cardiovascular:      Rate and Rhythm: Normal rate and regular rhythm.      Pulses: Normal pulses.      Heart sounds: No murmur heard.     No friction rub. No gallop.   Pulmonary:      Effort: Pulmonary effort is normal. No respiratory distress.      Breath sounds: Rhonchi present.   Abdominal:      General: There is no distension.      Palpations: Abdomen is soft.      Tenderness: There is no abdominal tenderness. There is no guarding or rebound.   Musculoskeletal:         General: Normal range of motion.      Cervical back: Neck supple. No tenderness.   Skin:     General: Skin is warm and dry.      Capillary Refill: Capillary refill takes less than 2 seconds.   Neurological:      General: No focal deficit present.      Mental Status: He is alert.   Psychiatric:         Mood and Affect: Mood normal.             Significant Labs: All pertinent labs within the past 24 hours have been reviewed.    Significant Imaging: I have reviewed all pertinent imaging results/findings within the past 24 hours.

## 2025-01-06 LAB
POCT GLUCOSE: 174 MG/DL (ref 70–110)
POCT GLUCOSE: 206 MG/DL (ref 70–110)
POCT GLUCOSE: 213 MG/DL (ref 70–110)
POCT GLUCOSE: 253 MG/DL (ref 70–110)

## 2025-01-06 PROCEDURE — 25000003 PHARM REV CODE 250: Performed by: INTERNAL MEDICINE

## 2025-01-06 PROCEDURE — 25000003 PHARM REV CODE 250: Performed by: STUDENT IN AN ORGANIZED HEALTH CARE EDUCATION/TRAINING PROGRAM

## 2025-01-06 PROCEDURE — 25000003 PHARM REV CODE 250: Performed by: PHYSICIAN ASSISTANT

## 2025-01-06 PROCEDURE — 63600175 PHARM REV CODE 636 W HCPCS: Performed by: INTERNAL MEDICINE

## 2025-01-06 PROCEDURE — 27000207 HC ISOLATION

## 2025-01-06 PROCEDURE — 21400001 HC TELEMETRY ROOM

## 2025-01-06 RX ORDER — METOPROLOL SUCCINATE 100 MG/1
100 TABLET, EXTENDED RELEASE ORAL DAILY
Status: DISCONTINUED | OUTPATIENT
Start: 2025-01-06 | End: 2025-01-07 | Stop reason: HOSPADM

## 2025-01-06 RX ADMIN — GABAPENTIN 300 MG: 300 CAPSULE ORAL at 09:01

## 2025-01-06 RX ADMIN — Medication 500 MG: at 09:01

## 2025-01-06 RX ADMIN — ENOXAPARIN SODIUM 50 MG: 60 INJECTION SUBCUTANEOUS at 09:01

## 2025-01-06 RX ADMIN — GUAIFENESIN AND DEXTROMETHORPHAN 10 ML: 100; 10 SYRUP ORAL at 04:01

## 2025-01-06 RX ADMIN — ATORVASTATIN CALCIUM 80 MG: 40 TABLET, FILM COATED ORAL at 09:01

## 2025-01-06 RX ADMIN — METOPROLOL SUCCINATE 100 MG: 100 TABLET, EXTENDED RELEASE ORAL at 06:01

## 2025-01-06 NOTE — ASSESSMENT & PLAN NOTE
Anemia is likely due to acute blood loss which was from blood draws . Most recent hemoglobin and hematocrit are listed below.  Recent Labs     01/04/25  0712 01/05/25  0605   HGB 14.6 13.7*   HCT 43.9 42.5       Plan  - Monitor serial CBC: Daily  - Transfuse PRBC if patient becomes hemodynamically unstable, symptomatic or H/H drops below 7/21.  - Patient has not received any PRBC transfusions to date  - Patient's anemia is currently worsening. Will continue current treatment Avoid labs unless acutely necessary.

## 2025-01-06 NOTE — ASSESSMENT & PLAN NOTE
Patient's FSGs are uncontrolled due to hyperglycemia on current medication regimen.  Last A1c reviewed-   Lab Results   Component Value Date    HGBA1C 6.9 (H) 01/03/2025     Most recent fingerstick glucose reviewed-   Recent Labs   Lab 01/05/25  1213 01/05/25  1617 01/05/25  2152 01/06/25  0823   POCTGLUCOSE 250* 300* 250* 174*       Current correctional scale  Low  Maintain anti-hyperglycemic dose as follows-   Antihyperglycemics (From admission, onward)    Start     Stop Route Frequency Ordered    01/03/25 0900  insulin glargine U-100 (Lantus) pen 5 Units         -- SubQ Daily 01/02/25 1538    01/02/25 1637  insulin aspart U-100 pen 0-5 Units         -- SubQ Before meals & nightly PRN 01/02/25 1538        Hold Oral hypoglycemics while patient is in the hospital.

## 2025-01-06 NOTE — ASSESSMENT & PLAN NOTE
Patient's blood pressure range in the last 24 hours was: BP  Min: 148/68  Max: 184/78.The patient's inpatient anti-hypertensive regimen is listed below:  Current Antihypertensives  metoprolol succinate (TOPROL-XL) 24 hr tablet 100 mg, Daily, Oral    Plan  - BP is controlled, no changes needed to their regimen  - Hold home Losartan/HCTZ due to BROOK   - tele

## 2025-01-06 NOTE — ASSESSMENT & PLAN NOTE
Patient's blood pressure range in the last 24 hours was: BP  Min: 148/68  Max: 184/78.The patient's inpatient anti-hypertensive regimen is listed below:  Current Antihypertensives  metoprolol succinate (TOPROL-XL) 24 hr tablet 100 mg, Daily, Oral    Plan  - BP is controlled, no changes needed to their regimen  - Hold home Losartan/HCTZ due to BROOK and dehydration  - Holding BB for now - restart as able  - tele

## 2025-01-06 NOTE — PROGRESS NOTES
[facility] Medicine  Progress Note    Patient Name: Kishan Dow  MRN: [id number]  Patient Class: IP- Inpatient   Admission Date: 1/2/2025  Length of Stay: 4 days  Attending Physician: Irvin Watkins MD  Primary Care Provider: No, Primary Doctor        Subjective     Principal Problem:COVID-19        HPI:  Kishan Dow is a 74 y.o. male with PMHx significant for DM II, HTN, HLD, CAD admitted to hospital medicine for volume overload and Covid 19. Patient reports B thoracic back pain that is worse with deep breaths and associated productive cough, SOB, sore throat, and fatigue for the past few days. Patient's daughter reports that at baseline, patient is ambulatory and lives with her. She noted that today he appeared weak, and was having difficulty ambulating independently which prompted her to call EMS. Denies fever/chills, diaphoresis, lightheadedness, HA, abdominal pain, n/v/d, urinary symptoms, changes in BMs, numbness/tingling, weakness.     In the ED, afebrile with HR 72, RR 18, /70. SpO2 92% on RA. WBC WNL. Na 134. Cr 1.3. Glucose 171. T bili 1.4. BNP 1068 (no Hx of CHF). HS Trop 62. Covid 19 +. CXR with atherosclerotic calcification present within the thoracic aorta. Pulmonary vasculature is within normal limits. The lungs are free of focal consolidations. There is no evidence for pneumothorax or large pleural effusions. Given duo neb x1, tylenol x1, lasix 40 mg x 1, and dexamethasone x1.     Overview/Hospital Course:  No notes on file    Interval History: No acute events overnight. Pt notes that his breathing is improving. He looks forward to discharging home but is afraid of giving Covid to his family.         Review of Systems   Constitutional:  Positive for fatigue. Negative for chills and fever.   HENT:  Negative for congestion, facial swelling, sinus pain and tinnitus.    Eyes:  Negative for pain and visual disturbance.   Respiratory:  Positive for cough and shortness of  breath. Negative for choking and wheezing.    Cardiovascular:  Negative for chest pain.   Gastrointestinal:  Negative for abdominal pain, constipation, diarrhea, nausea and vomiting.   Genitourinary:  Negative for difficulty urinating.   Musculoskeletal:  Negative for arthralgias, back pain and joint swelling.   Skin:  Negative for pallor, rash and wound.   Neurological:  Positive for weakness. Negative for dizziness and light-headedness.   Psychiatric/Behavioral:  Negative for agitation and behavioral problems.       Objective:      Vital Signs (Most Recent):  Temp: 97.6 °F (36.4 °C) (01/05/25 1216)  Pulse: 70 (01/05/25 1216)  Resp: 20 (01/05/25 1216)  BP: (!) 148/68 (01/05/25 1216)  SpO2: 98 % (01/05/25 1216) Vital Signs (24h Range):  Temp:  [97.6 °F (36.4 °C)-98.3 °F (36.8 °C)] 97.6 °F (36.4 °C)  Pulse:  [63-79] 70  Resp:  [20] 20  SpO2:  [95 %-100 %] 98 %  BP: (113-151)/(56-68) 148/68      Weight: 50 kg (110 lb 3.7 oz)  Body mass index is 17.79 kg/m².     Intake/Output Summary (Last 24 hours) at 1/5/2025 1302  Last data filed at 1/5/2025 1009      Gross per 24 hour   Intake --   Output 1175 ml   Net -1175 ml         Physical Exam  Vitals and nursing note reviewed.   Constitutional:       General: He is not in acute distress.  HENT:      Head: Normocephalic and atraumatic.      Nose: Nose normal.      Mouth/Throat:      Mouth: Mucous membranes are moist.   Eyes:      General: No scleral icterus.     Extraocular Movements: Extraocular movements intact.      Conjunctiva/sclera: Conjunctivae normal.   Cardiovascular:      Rate and Rhythm: Normal rate and regular rhythm.      Pulses: Normal pulses.      Heart sounds: No murmur heard.     No friction rub. No gallop.   Pulmonary:      Effort: Pulmonary effort is normal. No respiratory distress.      Breath sounds: Rhonchi present.   Abdominal:      General: There is no distension.      Palpations: Abdomen is soft.      Tenderness: There is no abdominal tenderness.  There is no guarding or rebound.   Musculoskeletal:         General: Normal range of motion.      Cervical back: Neck supple. No tenderness.   Skin:     General: Skin is warm and dry.      Capillary Refill: Capillary refill takes less than 2 seconds.   Neurological:      General: No focal deficit present.      Mental Status: He is alert.   Psychiatric:         Mood and Affect: Mood normal.          Assessment and Plan     * COVID-19  Patient is identified as Severe COVID-19 based on hypoxemia with O2 saturations <94% on room air or on ambulation   Initiate standard COVID protocols; COVID-19 testing ,Infection Control notification  and isolation- respiratory, contact and droplet per protocol    Diagnostics: CBC, CMP, CRP, BNP, Troponin, and Portable CXR    Management: Initiate targeted therapy with Remdesivir, 200mg IV x1, followed by 100mg IV daily x5 days total, Dexamethasone PO/IV 6mg daily x10 days, and Anticoagulation, Patient admitted to non-critical care unit- Will initiate full dose anticoagulation with Weight based lovenox 1mg/kg IV q12, Inhaled bronchodilators as needed for shortness of breath., and Continuous cardiac monitoring.    Advance Care Planning Current advance care plan has not been discussed with patient/family/POA and patient currently wishes Full Code.     Anemia  Anemia is likely due to acute blood loss which was from blood draws . Most recent hemoglobin and hematocrit are listed below.  Recent Labs     01/04/25  0712 01/05/25  0605   HGB 14.6 13.7*   HCT 43.9 42.5       Plan  - Monitor serial CBC: Daily  - Transfuse PRBC if patient becomes hemodynamically unstable, symptomatic or H/H drops below 7/21.  - Patient has not received any PRBC transfusions to date  - Patient's anemia is currently worsening. Will continue current treatment Avoid labs unless acutely necessary.       BROOK (acute kidney injury)  BROOK is likely due to pre-renal azotemia due to dehydration vs med vs less likely  cardiorenal. Baseline creatinine is  0.9-1 . Most recent creatinine and eGFR are listed below.  Recent Labs     01/04/25  0712 01/05/25  0605   CREATININE 1.4 1.2   EGFRNORACEVR 52.4* >60.0        Plan  - BROOK is improving  - Avoid nephrotoxins and renally dose meds for GFR listed above  - Monitor urine output, serial BMP, and adjust therapy as needed  - Follow renal lytes    Elevated brain natriuretic peptide (BNP) level  - BNP >1000 on admission with mild bump in Troponin without significant delta - possibly 2/2 demand related to COVID infection  - CXR unremarkable  - given lasix 40mg IV x 1 in the ED with bump in creatinine  - hold off on further lasix at this time  - Encourage PO intake  - ECHO 1/3/2025 with normal systolic and diastolic dysfunction. 55 - 60%.IVC 3 mmHg  - d/c fluid restriction  - strict input/output      HTN (hypertension)  Patient's blood pressure range in the last 24 hours was: BP  Min: 148/68  Max: 184/78.The patient's inpatient anti-hypertensive regimen is listed below:  Current Antihypertensives  metoprolol succinate (TOPROL-XL) 24 hr tablet 100 mg, Daily, Oral    Plan  - BP is controlled, no changes needed to their regimen  - Hold home Losartan/HCTZ due to BROOK   - tele    Hyperlipidemia  Continue Atorvastatin. No side effects noted.     Diabetes mellitus  Patient's FSGs are uncontrolled due to hyperglycemia on current medication regimen.  Last A1c reviewed-   Lab Results   Component Value Date    HGBA1C 6.9 (H) 01/03/2025     Most recent fingerstick glucose reviewed-   Recent Labs   Lab 01/05/25  1213 01/05/25  1617 01/05/25  2152 01/06/25  0823   POCTGLUCOSE 250* 300* 250* 174*       Current correctional scale  Low  Maintain anti-hyperglycemic dose as follows-   Antihyperglycemics (From admission, onward)      Start     Stop Route Frequency Ordered    01/03/25 0900  insulin glargine U-100 (Lantus) pen 5 Units         -- SubQ Daily 01/02/25 1538    01/02/25 1637  insulin aspart U-100 pen  0-5 Units         -- SubQ Before meals & nightly PRN 01/02/25 1538          Hold Oral hypoglycemics while patient is in the hospital.    Arteriosclerosis of coronary artery  Patient with known CAD which is controlled Will continue ASA, Plavix, and Statin and monitor for S/Sx of angina/ACS. Continue to monitor on telemetry.       VTE Risk Mitigation (From admission, onward)           Ordered     enoxaparin injection 50 mg  Every 12 hours         01/05/25 1312     IP VTE HIGH RISK PATIENT  Once         01/02/25 1528     Place sequential compression device  Until discontinued         01/02/25 1528                    Discharge Planning   BHAVIK: 1/7/2025     Code Status: Full Code   Medical Readiness for Discharge Date:   Discharge Plan A: Home                        Irvin Watkins MD  Department of Hospital Medicine   [facility] Surg

## 2025-01-06 NOTE — SUBJECTIVE & OBJECTIVE
Interval History: No acute events overnight.  Notes continued cough, congestion, and dyspnea but states these symptoms are stable. Looking forward to discharging tomorrow.     Review of Systems   Constitutional:  Negative for chills, fatigue and fever.   HENT:  Positive for congestion. Negative for ear pain, facial swelling, sore throat and tinnitus.    Eyes:  Negative for pain and visual disturbance.   Respiratory:  Positive for cough and shortness of breath.    Cardiovascular:  Negative for chest pain and leg swelling.   Gastrointestinal:  Negative for abdominal pain, constipation, diarrhea, nausea and vomiting.   Genitourinary:  Negative for difficulty urinating.   Musculoskeletal:  Negative for arthralgias and back pain.   Skin:  Negative for pallor, rash and wound.   Neurological:  Negative for dizziness, light-headedness and headaches.   Psychiatric/Behavioral:  Negative for agitation and behavioral problems.      Objective:     Vital Signs (Most Recent):  Temp: 97.7 °F (36.5 °C) (01/06/25 0825)  Pulse: (!) 56 (01/06/25 1050)  Resp: 18 (01/06/25 0825)  BP: (!) 166/73 (01/06/25 0825)  SpO2: 99 % (01/06/25 0825) Vital Signs (24h Range):  Temp:  [97.6 °F (36.4 °C)-97.9 °F (36.6 °C)] 97.7 °F (36.5 °C)  Pulse:  [56-72] 56  Resp:  [18-22] 18  SpO2:  [96 %-99 %] 99 %  BP: (148-184)/(68-78) 166/73     Weight: 50 kg (110 lb 3.7 oz)  Body mass index is 17.79 kg/m².    Intake/Output Summary (Last 24 hours) at 1/6/2025 1108  Last data filed at 1/6/2025 0855  Gross per 24 hour   Intake --   Output 1100 ml   Net -1100 ml         Physical Exam  Vitals and nursing note reviewed.   Constitutional:       General: He is not in acute distress.  HENT:      Head: Normocephalic and atraumatic.      Nose: Nose normal.      Mouth/Throat:      Mouth: Mucous membranes are moist.   Eyes:      General: No scleral icterus.     Extraocular Movements: Extraocular movements intact.   Cardiovascular:      Rate and Rhythm: Normal rate and  regular rhythm.      Heart sounds: No murmur heard.     No friction rub. No gallop.   Pulmonary:      Effort: Pulmonary effort is normal. No respiratory distress.      Breath sounds: Rhonchi present.   Abdominal:      General: There is no distension.      Palpations: Abdomen is soft.      Tenderness: There is no abdominal tenderness. There is no guarding or rebound.   Musculoskeletal:         General: Normal range of motion.      Cervical back: Neck supple. No tenderness.   Skin:     General: Skin is warm and dry.      Capillary Refill: Capillary refill takes less than 2 seconds.   Neurological:      General: No focal deficit present.      Mental Status: He is alert.   Psychiatric:         Mood and Affect: Mood normal.             Significant Labs: All pertinent labs within the past 24 hours have been reviewed.    Significant Imaging: I have reviewed all pertinent imaging results/findings within the past 24 hours.

## 2025-01-06 NOTE — ASSESSMENT & PLAN NOTE
BROOK is likely due to pre-renal azotemia due to dehydration vs med vs less likely cardiorenal. Baseline creatinine is  0.9-1 . Most recent creatinine and eGFR are listed below.  Recent Labs     01/04/25  0712 01/05/25  0605   CREATININE 1.4 1.2   EGFRNORACEVR 52.4* >60.0        Plan  - BROOK is improving  - Avoid nephrotoxins and renally dose meds for GFR listed above  - Monitor urine output, serial BMP, and adjust therapy as needed  - Follow renal lytes

## 2025-01-07 ENCOUNTER — DOCUMENTATION ONLY (OUTPATIENT)
Dept: CARDIOLOGY | Facility: CLINIC | Age: 75
End: 2025-01-07
Payer: MEDICARE

## 2025-01-07 VITALS
SYSTOLIC BLOOD PRESSURE: 179 MMHG | RESPIRATION RATE: 18 BRPM | HEART RATE: 56 BPM | TEMPERATURE: 98 F | OXYGEN SATURATION: 95 % | WEIGHT: 110.25 LBS | HEIGHT: 66 IN | BODY MASS INDEX: 17.72 KG/M2 | DIASTOLIC BLOOD PRESSURE: 69 MMHG

## 2025-01-07 LAB
POCT GLUCOSE: 164 MG/DL (ref 70–110)
POCT GLUCOSE: 216 MG/DL (ref 70–110)

## 2025-01-07 PROCEDURE — 25000003 PHARM REV CODE 250: Performed by: PHYSICIAN ASSISTANT

## 2025-01-07 PROCEDURE — 63600175 PHARM REV CODE 636 W HCPCS: Performed by: PHYSICIAN ASSISTANT

## 2025-01-07 PROCEDURE — 25000003 PHARM REV CODE 250: Performed by: INTERNAL MEDICINE

## 2025-01-07 PROCEDURE — 25000003 PHARM REV CODE 250: Performed by: STUDENT IN AN ORGANIZED HEALTH CARE EDUCATION/TRAINING PROGRAM

## 2025-01-07 PROCEDURE — 63600175 PHARM REV CODE 636 W HCPCS: Performed by: INTERNAL MEDICINE

## 2025-01-07 RX ORDER — PREDNISONE 20 MG/1
40 TABLET ORAL DAILY
Qty: 8 TABLET | Refills: 0 | Status: SHIPPED | OUTPATIENT
Start: 2025-01-07 | End: 2025-01-11

## 2025-01-07 RX ORDER — GABAPENTIN 300 MG/1
300 CAPSULE ORAL 2 TIMES DAILY
Start: 2025-01-07

## 2025-01-07 RX ADMIN — ASPIRIN 81 MG: 81 TABLET, COATED ORAL at 09:01

## 2025-01-07 RX ADMIN — DEXAMETHASONE 6 MG: 4 TABLET ORAL at 09:01

## 2025-01-07 RX ADMIN — PANTOPRAZOLE SODIUM 40 MG: 40 TABLET, DELAYED RELEASE ORAL at 09:01

## 2025-01-07 RX ADMIN — FLUTICASONE PROPIONATE 100 MCG: 50 SPRAY, METERED NASAL at 09:01

## 2025-01-07 RX ADMIN — INSULIN GLARGINE 5 UNITS: 100 INJECTION, SOLUTION SUBCUTANEOUS at 09:01

## 2025-01-07 RX ADMIN — THERA TABS 1 TABLET: TAB at 09:01

## 2025-01-07 RX ADMIN — INSULIN ASPART 1 UNITS: 100 INJECTION, SOLUTION INTRAVENOUS; SUBCUTANEOUS at 01:01

## 2025-01-07 RX ADMIN — CLOPIDOGREL BISULFATE 75 MG: 75 TABLET ORAL at 09:01

## 2025-01-07 RX ADMIN — Medication 100 MG: at 09:01

## 2025-01-07 RX ADMIN — ENOXAPARIN SODIUM 50 MG: 60 INJECTION SUBCUTANEOUS at 09:01

## 2025-01-07 RX ADMIN — Medication 500 MG: at 09:01

## 2025-01-07 RX ADMIN — GABAPENTIN 300 MG: 300 CAPSULE ORAL at 09:01

## 2025-01-07 RX ADMIN — METOPROLOL SUCCINATE 100 MG: 100 TABLET, EXTENDED RELEASE ORAL at 09:01

## 2025-01-07 NOTE — PLAN OF CARE
Problem: Adult Inpatient Plan of Care  Goal: Plan of Care Review  1/7/2025 0417 by Lanny Villa RN  Outcome: Not Progressing  1/6/2025 2149 by Lanny Villa RN  Outcome: Progressing  Goal: Patient-Specific Goal (Individualized)  1/7/2025 0417 by Lanny Villa RN  Outcome: Not Progressing  1/6/2025 2149 by Lanny Villa RN  Outcome: Progressing  Goal: Absence of Hospital-Acquired Illness or Injury  1/7/2025 0417 by Lanny Villa RN  Outcome: Not Progressing  1/6/2025 2149 by Lanny Villa RN  Outcome: Progressing  Goal: Optimal Comfort and Wellbeing  1/7/2025 0417 by Lanny Villa RN  Outcome: Not Progressing  1/6/2025 2149 by Lanny Villa RN  Outcome: Progressing  Goal: Readiness for Transition of Care  1/7/2025 0417 by Lanny Villa RN  Outcome: Not Progressing  1/6/2025 2149 by Lanny Villa RN  Outcome: Progressing

## 2025-01-07 NOTE — PLAN OF CARE
Patient discharged. Discharge instructions explained, verbalized understanding. IV removed, catheter tip intact. Patient waiting on medications and transportation. Will continue to monitor patient.   Problem: Adult Inpatient Plan of Care  Goal: Plan of Care Review  Outcome: Met  Goal: Patient-Specific Goal (Individualized)  Outcome: Met  Goal: Absence of Hospital-Acquired Illness or Injury  Outcome: Met  Goal: Optimal Comfort and Wellbeing  Outcome: Met  Goal: Readiness for Transition of Care  Outcome: Met     Problem: Diabetes Comorbidity  Goal: Blood Glucose Level Within Targeted Range  Outcome: Met     Problem: Acute Kidney Injury/Impairment  Goal: Fluid and Electrolyte Balance  Outcome: Met  Goal: Improved Oral Intake  Outcome: Met  Goal: Effective Renal Function  Outcome: Met     Problem: Fall Injury Risk  Goal: Absence of Fall and Fall-Related Injury  Outcome: Met

## 2025-01-07 NOTE — DISCHARGE SUMMARY
[facility] Medicine  Discharge Summary      Patient Name: Kishan Dow  MRN: [id number]  Admission Date: 1/2/2025  Hospital Length of Stay: 5 days  Discharge Date and Time:  01/07/2025 12:09 PM  Attending Physician: Irvin Watkins MD   Discharging Provider: Irvin Watkins MD  Discharge Provider Team: [facility]  Primary Care Provider: Michelle Primary Doctor        HPI: Kishan Dow is a 74 y.o. male with PMHx significant for DM II, HTN, HLD, CAD admitted to hospital medicine for volume overload and Covid 19. Patient reports B thoracic back pain that is worse with deep breaths and associated productive cough, SOB, sore throat, and fatigue for the past few days. Patient's daughter reports that at baseline, patient is ambulatory and lives with her. She noted that today he appeared weak, and was having difficulty ambulating independently which prompted her to call EMS. Denies fever/chills, diaphoresis, lightheadedness, HA, abdominal pain, n/v/d, urinary symptoms, changes in BMs, numbness/tingling, weakness.      In the ED, afebrile with HR 72, RR 18, /70. SpO2 92% on RA. WBC WNL. Na 134. Cr 1.3. Glucose 171. T bili 1.4. BNP 1068 (no Hx of CHF). HS Trop 62. Covid 19 +. CXR with atherosclerotic calcification present within the thoracic aorta. Pulmonary vasculature is within normal limits. The lungs are free of focal consolidations. There is no evidence for pneumothorax or large pleural effusions. Given duo neb x1, tylenol x1, lasix 40 mg x 1, and dexamethasone x1.     * No surgery found *      Hospital Course:   Pt admitted for Covid-19 and started on Remdesivir and Dexamethasone. Hospital course was otherwise uncomplicated and the patient was able to discharge to home. O2 test was done prior to discharge and the patient did not require Home O2.       Consults:   Consults (From admission, onward)          Status Ordering Provider     Inpatient consult to Social Work/Case Management  Once         Provider:  (Not yet assigned)    Completed KENNY MARES     Inpatient consult to Registered Dietitian/Nutritionist  Once        Provider:  (Not yet assigned)    Completed KENNY MARES            Final Active Diagnoses:    Diagnosis Date Noted POA    PRINCIPAL PROBLEM:  COVID-19 [U07.1] 01/02/2025 Yes    Anemia [D64.9] 01/05/2025 No    BROOK (acute kidney injury) [N17.9] 01/03/2025 Yes    HTN (hypertension) [I10] 01/02/2025 Yes    Hyperlipidemia [E78.5] 01/02/2025 Yes    Diabetes mellitus [E11.9] 01/02/2025 Yes    Arteriosclerosis of coronary artery [I25.10] 01/02/2025 Yes    Elevated brain natriuretic peptide (BNP) level [R79.89] 01/02/2025 Yes      Problems Resolved During this Admission:    Diagnosis Date Noted Date Resolved POA    Elevated troponin likely iso Demand [R79.89] 01/02/2025 01/04/2025 Yes      Discharged Condition: good    Disposition: Home or Self Care    Follow Up:    Patient Instructions:      Ambulatory referral/consult to [facility]   Standing Status: Future   Referral Priority: Routine Referral Type: Consultation   Referral Reason: Specialty Services Required   Requested Specialty: Cardiology   Number of Visits Requested: 1     Diet Cardiac     Call MD for:  temperature >100.4     Call MD for:  persistent nausea and vomiting or diarrhea     Call MD for:  severe uncontrolled pain     Call MD for:  redness, tenderness, or signs of infection (pain, swelling, redness, odor or green/yellow discharge around incision site)     Call MD for:  difficulty breathing or increased cough     Call MD for:  severe persistent headache     Call MD for:  worsening rash     Call MD for:  persistent dizziness, light-headedness, or visual disturbances     Call MD for:  increased confusion or weakness     Activity as tolerated     Medications:  Reconciled Home Medications:      Medication List        START taking these medications      predniSONE 20 MG tablet  Commonly known as:  DELTASONE  Take 2 tablets (40 mg total) by mouth once daily. for 4 days            CHANGE how you take these medications      gabapentin 300 MG capsule  Commonly known as: NEURONTIN  Take 1 capsule (300 mg total) by mouth 2 (two) times daily.  What changed: when to take this            CONTINUE taking these medications      aspirin 81 MG EC tablet  Commonly known as: ECOTRIN  Take 81 mg by mouth once daily.     atorvastatin 80 MG tablet  Commonly known as: LIPITOR  Take 80 mg by mouth every evening.     clopidogreL 75 mg tablet  Commonly known as: PLAVIX  Take 75 mg by mouth once daily.     glyBURIDE 5 MG tablet  Commonly known as: DIABETA  Take 5 mg by mouth daily with breakfast.     loratadine 10 mg tablet  Commonly known as: CLARITIN  Take 1 tablet (10 mg total) by mouth once daily.     losartan-hydrochlorothiazide 100-12.5 mg 100-12.5 mg Tab  Commonly known as: HYZAAR  Take 1 tablet by mouth once daily.     metFORMIN 1000 MG tablet  Commonly known as: GLUCOPHAGE  Take 1,000 mg by mouth 2 (two) times daily with meals.     metoprolol succinate 100 MG 24 hr tablet  Commonly known as: TOPROL-XL  Take 1 tablet (100 mg total) by mouth once daily.     nitroGLYCERIN 0.3 MG SL tablet  Commonly known as: NITROSTAT  Place 0.3 mg under the tongue every 5 (five) minutes as needed for Chest pain.     omeprazole 40 MG capsule  Commonly known as: PRILOSEC  Take 40 mg by mouth once daily.     pioglitazone 45 MG tablet  Commonly known as: ACTOS  Take 45 mg by mouth once daily.     promethazine 25 MG tablet  Commonly known as: PHENERGAN  Take 1 tablet (25 mg total) by mouth every 6 (six) hours as needed for Nausea.     TRADJENTA 5 mg Tab tablet  Generic drug: linaGLIPtin  Take 5 mg by mouth once daily.     traMADoL 50 mg tablet  Commonly known as: ULTRAM  Take 1 tablet (50 mg total) by mouth every 6 (six) hours as needed for Pain.     VITAMIN B-1 100 MG tablet  Generic drug: thiamine  Take 100 mg by mouth once daily.               Significant Diagnostic Studies: N/A    Pending Diagnostic Studies:       None          Indwelling Lines/Drains at time of discharge:   Lines/Drains/Airways       None                   Time spent on the discharge of patient: 30 minutes         Irvin Watkins MD  Department of Hospital Medicine  [facility]

## 2025-01-07 NOTE — PROGRESS NOTES
"[facility] Med S[facility]  Adult Nutrition  Progress Note    SUMMARY       Recommendations    1. Continue diabetic diet as tolerated  2. Recommend boost TID   3. Encourage good intake   4. RD to monitor weight, PO intake, labs    Goals: % nutritional needs met with diet  Nutrition Goal Status: new  Communication of RD Recs: other (comment) (POC)    Assessment and Plan    Nutrition Problem  Inadequate energy intake     Related to (etiology):   Poor appetite/intake    Signs and Symptoms (as evidenced by):   0% PO intake     Interventions/Recommendations (treatment strategy):  Collaboration with other providers     Nutrition Diagnosis Status:   New    Reason for Assessment    Reason For Assessment: RD follow-up  Diagnosis: other (see comments) (COVID)  General Information Comments: Pt admitted with COVID. PMHx: DM 2, HTN, HLD, CAD. Pt having generalized weakness. PO: 0% via flowsheets. BM: 1/5. No GI signs/symptoms - n/v/d.  Nutrition Discharge Planning: diabetic diet    Nutrition/Diet History    Spiritual, Cultural Beliefs, Mandaeism Practices, Values that Affect Care: no  Food Allergies: NKFA    Nutrition Related Social Determinants of Health: SDOH: Unable to assess at this time.     Food Insecurity: No Food Insecurity (1/3/2025)    Hunger Vital Sign     Worried About Running Out of Food in the Last Year: Never true     Ran Out of Food in the Last Year: Never true     Anthropometrics    Temp: 97.5 °F (36.4 °C)  Height Method: Stated  Height: 5' 6" (167.6 cm)  Height (inches): 66 in  Weight Method: Bed Scale  Weight: 50 kg (110 lb 3.7 oz)  Weight (lb): 110.23 lb  Ideal Body Weight (IBW), Male: 142 lb  % Ideal Body Weight, Male (lb): 77.63 %  BMI (Calculated): 17.8  BMI Grade: 17 - 18.4 protein-energy malnutrition grade I     Lab/Procedures/Meds    Pertinent Labs Reviewed: reviewed  Pertinent Labs Comments: RBC 4.55 low, hemoglobin 13.7 low, BUN 45 high, ALT 9 low, Ca 8.5 low  Pertinent Medications Reviewed: " reviewed  Pertinent Medications Comments: vit C, aspirin, atorvastatin, clopidogrel, enoxaparin, gabapentin, lantus, metoprolol, MVI, pantoprazole, thiamine    Estimated/Assessed Needs    Weight Used For Calorie Calculations: 50 kg (110 lb 3.7 oz)  Energy Calorie Requirements (kcal): 1531 kcal (NCM)  Energy Need Method: Elm Mott-St Jeor (* 1.3)  Protein Requirements:  g/pro (0.8-2.0 g/kg) (NCM)  Weight Used For Protein Calculations: 50 kg (110 lb 3.7 oz)        RDA Method (mL): 1531     Nutrition Prescription Ordered    Current Diet Order: diabetic diet    Evaluation of Received Nutrient/Fluid Intake     % Intake of Estimated Energy Needs: 0 - 25 %  % Meal Intake: 0 - 25 %    Nutrition Risk    Level of Risk/Frequency of Follow-up: moderate - high     Monitor and Evaluation    Food and Nutrient Intake: energy intake, food and beverage intake  Food and Nutrient Adminstration: diet order  Knowledge/Beliefs/Attitudes: food and nutrition knowledge/skill, beliefs and attitudes  Physical Activity and Function: nutrition-related ADLs and IADLs, factors affecting access to physical activity  Anthropometric Measurements: body mass index, weight change, weight  Biochemical Data, Medical Tests and Procedures: lipid profile, inflammatory profile, glucose/endocrine profile, gastrointestinal profile, electrolyte and renal panel  Nutrition-Focused Physical Findings: overall appearance     Nutrition Follow-Up    RD Follow-up?: Yes

## 2025-01-07 NOTE — PROGRESS NOTES
[facility] ([facility]) Team notified of pt referral via Ambulatory Referral to Heart Failure [facility]itional Care ([id number]).    Patient screened today 1/7/2025 by provider and LPN for enrollment to program.      Pt was deemed not a candidate for enrollment at this time related to  PP covid positive.    Pt will require additional follow up planning per primary team.     If pt status, diagnosis, or treatment plan changes , please place AMB referral to [facility] ([id number]) for [facility] enrollment re-evalution.

## 2025-01-07 NOTE — PLAN OF CARE
[facility]  Discharge Final Note    Primary Care Provider: No, Primary Doctor    Expected Discharge Date: 1/7/2025          Pt discharged home, no needs.    Discharge Plan A and Plan B have been determined by review of patient's clinical status, future medical and therapeutic needs, and coverage/benefits for post-acute care in coordination with multidisciplinary team members.      Final Discharge Note (most recent)       Final Note - 01/07/25 1529          Final Note    Assessment Type Final Discharge Note (P)      Anticipated Discharge Disposition Home or Self Care (P)      What phone number can be called within the next 1-3 days to see how you are doing after discharge? [phone] (P)         Post-Acute Status    Post-Acute Authorization Other (P)      Other Status No Post-Acute Service Needs (P)      Discharge Delays None known at this time (P)                      Important Message from Medicare         SILVINO Buchanan  Case Management  [phone]

## 2025-01-07 NOTE — NURSING
Home Oxygen Evaluation    Date Performed: 2025    1) Patient's Home O2 Sat on room air, while at rest: 99        If O2 sats on room air at rest are 88% or below, patient qualifies. No additional testing needed. Document N/A in steps 2 and 3. If 89% or above, complete steps 2.      2) Patient's O2 Sat on room air while exercisin        If O2 sats on room air while exercising remain 89% or above patient does not qualify, no further testing needed Document N/A in step 3. If O2 sats on room air while exercising are 88% or below, continue to step 3.      3) Patient's O2 Sat while exercising on O2: 95 at 2 LPM         (Must show improvement from #2 for patients to qualify)    If O2 sats improve on oxygen, patient qualifies for portable oxygen. If not, the patient does not qualify.

## 2025-01-07 NOTE — PLAN OF CARE
Recommendations     1. Continue diabetic diet as tolerated  2. Recommend boost TID   3. Encourage good intake   4. RD to monitor weight, PO intake, labs     Goals: % nutritional needs met with diet  Nutrition Goal Status: new  Communication of RD Recs: other (comment) (POC)

## 2025-01-08 NOTE — PLAN OF CARE
APPOINTMENT:    Patient Appointment(s) scheduled with  Suzanne Caban PA-C Tuesday Jan 14, 2025 2:30 PM

## 2025-05-21 NOTE — PROGRESS NOTES
[facility] Medicine  Progress Note    Patient Name: Kishan Dow  MRN: [id number]  Patient Class: IP- Inpatient   Admission Date: 1/2/2025  Length of Stay: 2 days  Attending Physician: Irvin Watkins MD  Primary Care Provider: Michelle, Primary Doctor        Subjective     Principal Problem:COVID-19        HPI:  Kishan Dow is a 74 y.o. male with PMHx significant for DM II, HTN, HLD, CAD admitted to hospital medicine for volume overload and Covid 19. Patient reports B thoracic back pain that is worse with deep breaths and associated productive cough, SOB, sore throat, and fatigue for the past few days. Patient's daughter reports that at baseline, patient is ambulatory and lives with her. She noted that today he appeared weak, and was having difficulty ambulating independently which prompted her to call EMS. Denies fever/chills, diaphoresis, lightheadedness, HA, abdominal pain, n/v/d, urinary symptoms, changes in BMs, numbness/tingling, weakness.     In the ED, afebrile with HR 72, RR 18, /70. SpO2 92% on RA. WBC WNL. Na 134. Cr 1.3. Glucose 171. T bili 1.4. BNP 1068 (no Hx of CHF). HS Trop 62. Covid 19 +. CXR with atherosclerotic calcification present within the thoracic aorta. Pulmonary vasculature is within normal limits. The lungs are free of focal consolidations. There is no evidence for pneumothorax or large pleural effusions. Given duo neb x1, tylenol x1, lasix 40 mg x 1, and dexamethasone x1.     Overview/Hospital Course:  No notes on file    Interval History: No acute events overnight. Pt remains on supplemental O2 via     Review of Systems   Constitutional:  Positive for fatigue. Negative for chills and fever.   HENT:  Negative for congestion and sinus pain.    Respiratory:  Positive for cough and shortness of breath. Negative for chest tightness.    Cardiovascular:  Negative for leg swelling.   Gastrointestinal:  Negative for abdominal pain, constipation, diarrhea, nausea and  vomiting.   Genitourinary:  Negative for difficulty urinating.   Musculoskeletal:  Negative for arthralgias, back pain, joint swelling and neck pain.   Skin:  Negative for pallor, rash and wound.   Neurological:  Negative for dizziness and light-headedness.   Psychiatric/Behavioral:  Negative for agitation and behavioral problems.      Objective:     Vital Signs (Most Recent):  Temp: 98.9 °F (37.2 °C) (01/04/25 1211)  Pulse: 79 (01/04/25 1211)  Resp: (!) 24 (01/04/25 1211)  BP: (!) 140/63 (01/04/25 1211)  SpO2: 97 % (01/04/25 1211) Vital Signs (24h Range):  Temp:  [97.7 °F (36.5 °C)-98.9 °F (37.2 °C)] 98.9 °F (37.2 °C)  Pulse:  [70-84] 79  Resp:  [16-24] 24  SpO2:  [95 %-99 %] 97 %  BP: (125-151)/(60-68) 140/63     Weight: 50 kg (110 lb 3.7 oz)  Body mass index is 17.79 kg/m².    Intake/Output Summary (Last 24 hours) at 1/4/2025 1226  Last data filed at 1/4/2025 0613  Gross per 24 hour   Intake 240 ml   Output 500 ml   Net -260 ml         Physical Exam  Vitals and nursing note reviewed.   Constitutional:       General: He is not in acute distress.     Appearance: Normal appearance.   HENT:      Head: Normocephalic and atraumatic.      Nose: Nose normal.      Mouth/Throat:      Mouth: Mucous membranes are moist.   Eyes:      General: No scleral icterus.     Extraocular Movements: Extraocular movements intact.      Conjunctiva/sclera: Conjunctivae normal.   Cardiovascular:      Rate and Rhythm: Normal rate and regular rhythm.      Pulses: Normal pulses.      Heart sounds: No murmur heard.     No friction rub. No gallop.   Pulmonary:      Effort: Pulmonary effort is normal. No respiratory distress.      Breath sounds: Rhonchi present.   Abdominal:      Palpations: Abdomen is soft.      Tenderness: There is no abdominal tenderness. There is no guarding or rebound.   Musculoskeletal:         General: Normal range of motion.      Cervical back: Normal range of motion and neck supple. No rigidity or tenderness.   Skin:      General: Skin is warm and dry.      Capillary Refill: Capillary refill takes less than 2 seconds.   Neurological:      General: No focal deficit present.      Mental Status: He is alert.   Psychiatric:         Mood and Affect: Mood normal.             Significant Labs: All pertinent labs within the past 24 hours have been reviewed.    Significant Imaging: I have reviewed all pertinent imaging results/findings within the past 24 hours.    Assessment and Plan     * COVID-19  Patient is identified as Severe COVID-19 based on hypoxemia with O2 saturations <94% on room air or on ambulation   Initiate standard COVID protocols; COVID-19 testing ,Infection Control notification  and isolation- respiratory, contact and droplet per protocol    Diagnostics: CBC, CMP, CRP, BNP, Troponin, and Portable CXR    Management: Initiate targeted therapy with Remdesivir, 200mg IV x1, followed by 100mg IV daily x5 days total, Dexamethasone PO/IV 6mg daily x10 days, and Anticoagulation, Patient admitted to non-critical care unit- Will initiate full dose anticoagulation with Weight based lovenox 1mg/kg IV q12, Inhaled bronchodilators as needed for shortness of breath., and Continuous cardiac monitoring.    Advance Care Planning Current advance care plan has not been discussed with patient/family/POA and patient currently wishes Full Code.     BROOK (acute kidney injury)  BROOK is likely due to pre-renal azotemia due to dehydration vs med vs less likely cardiorenal. Baseline creatinine is  0.9-1 . Most recent creatinine and eGFR are listed below.  Recent Labs     01/02/25  1415 01/03/25  0445 01/04/25  0712   CREATININE 1.3 1.6* 1.4   EGFRNORACEVR 57.3* 44.7* 52.4*        Plan  - BROOK is improving  - Avoid nephrotoxins and renally dose meds for GFR listed above  - Monitor urine output, serial BMP, and adjust therapy as needed  - Follow renal lytes    Elevated brain natriuretic peptide (BNP) level  - BNP >1000 on admission with mild bump in  Troponin without significant delta - possibly 2/2 demand related to COVID infection  - CXR unremarkable  - given lasix 40mg IV x 1 in the ED with bump in creatinine  - hold off on further lasix at this time  - Encourage PO intake  - ECHO 1/3/2025 with normal systolic and diastolic dysfunction. 55 - 60%.IVC 3 mmHg  - d/c fluid restriction  - strict input/output      HTN (hypertension)  Patient's blood pressure range in the last 24 hours was: BP  Min: 125/60  Max: 151/68.The patient's inpatient anti-hypertensive regimen is listed below:  Current Antihypertensives       Plan  - BP is controlled, no changes needed to their regimen  - Hold home Losartan/HCTZ due to BROOK and dehydration  - Holding BB for now - restart as able  - tele    Hyperlipidemia  Continue Atorvastatin. No side effects noted.     Diabetes mellitus  Patient's FSGs are uncontrolled due to hyperglycemia on current medication regimen.  Last A1c reviewed-   Lab Results   Component Value Date    HGBA1C 6.9 (H) 01/03/2025     Most recent fingerstick glucose reviewed-   Recent Labs   Lab 01/04/25  0746 01/04/25  1205   POCTGLUCOSE 144* 226*       Current correctional scale  Low  Maintain anti-hyperglycemic dose as follows-   Antihyperglycemics (From admission, onward)      Start     Stop Route Frequency Ordered    01/03/25 0900  insulin glargine U-100 (Lantus) pen 5 Units         -- SubQ Daily 01/02/25 1538    01/02/25 1637  insulin aspart U-100 pen 0-5 Units         -- SubQ Before meals & nightly PRN 01/02/25 1538          Hold Oral hypoglycemics while patient is in the hospital.    Arteriosclerosis of coronary artery  Patient with known CAD which is controlled Will continue ASA, Plavix, and Statin and monitor for S/Sx of angina/ACS. Continue to monitor on telemetry.       VTE Risk Mitigation (From admission, onward)           Ordered     enoxaparin injection 50 mg  Every 24 hours         01/03/25 1506     IP VTE HIGH RISK PATIENT  Once         01/02/25 1528      Place sequential compression device  Until discontinued         01/02/25 1528                    Discharge Planning   BHAVIK: 1/7/2025     Code Status: Full Code   Medical Readiness for Discharge Date:   Discharge Plan A: Home                        Irvin Watkins MD  Department of Hospital Medicine   [facility]     dizziness

## (undated) DEVICE — PAD CAST SPECIALIST STRL 4

## (undated) DEVICE — SUT ETHILON 4-0 PS2 18 BLK

## (undated) DEVICE — GAUZE WOVEN STRL 12-PLY 4X4IN

## (undated) DEVICE — PAD CAST 2 IN X 4YDS STERILE

## (undated) DEVICE — NDL 22GA X1 1/2 REG BEVEL

## (undated) DEVICE — DRAPE STERI-DRAPE 1000 17X11IN

## (undated) DEVICE — DRESSING N ADH OIL EMUL 3X3

## (undated) DEVICE — GLOVE BIOGEL ECLIPSE SZ 7

## (undated) DEVICE — GLOVE BIOGEL PI MICRO SZ 7

## (undated) DEVICE — SOL IRR SOD CHL .9% POUR

## (undated) DEVICE — COVER LIGHT HANDLE 80/CA

## (undated) DEVICE — COVER CAMERA OPERATING ROOM

## (undated) DEVICE — GLOVE BIOGEL SKINSENSE PI 7.0

## (undated) DEVICE — BANDAGE MATRIX HK LOOP 2IN 5YD

## (undated) DEVICE — Device

## (undated) DEVICE — TOURNIQUET SB QC DP 18X4IN